# Patient Record
Sex: FEMALE | Race: WHITE | Employment: PART TIME | URBAN - METROPOLITAN AREA
[De-identification: names, ages, dates, MRNs, and addresses within clinical notes are randomized per-mention and may not be internally consistent; named-entity substitution may affect disease eponyms.]

---

## 2022-02-28 NOTE — PERIOP NOTES
PAT APPT 3/7/22 @ 800 /  CLASS 3/7/22 @ 350 / PATIENT WILL HAVE COVID TEST DONE AT [facility] AND WILL FAX THE RESULTS  [facility]

## 2022-03-07 ENCOUNTER — HOSPITAL ENCOUNTER (OUTPATIENT)
Dept: PREADMISSION TESTING | Age: 67
Discharge: HOME | End: 2022-03-07
Payer: MEDICARE

## 2022-03-07 VITALS
HEART RATE: 64 BPM | SYSTOLIC BLOOD PRESSURE: 120 MMHG | BODY MASS INDEX: 39.9 KG/M2 | DIASTOLIC BLOOD PRESSURE: 75 MMHG | WEIGHT: 233.69 LBS | TEMPERATURE: 98.4 F | HEIGHT: 64 IN

## 2022-03-07 LAB
ABO + RH BLD: NORMAL
APPEARANCE UR: CLEAR
ATRIAL RATE: 62 BPM
BACTERIA URNS QL MICRO: NEGATIVE /HPF
BILIRUB UR QL: NEGATIVE
BLOOD GROUP ANTIBODIES SERPL: NORMAL
CALCULATED P AXIS, ECG09: 44 DEGREES
CALCULATED R AXIS, ECG10: -12 DEGREES
CALCULATED T AXIS, ECG11: 7 DEGREES
COLOR UR: ABNORMAL
DIAGNOSIS, 93000: NORMAL
EPITH CASTS URNS QL MICRO: ABNORMAL /LPF
GLUCOSE UR STRIP.AUTO-MCNC: NEGATIVE MG/DL
HGB UR QL STRIP: NEGATIVE
HYALINE CASTS URNS QL MICRO: ABNORMAL /LPF (ref 0–5)
KETONES UR QL STRIP.AUTO: ABNORMAL MG/DL
LEUKOCYTE ESTERASE UR QL STRIP.AUTO: NEGATIVE
MUCOUS THREADS URNS QL MICRO: ABNORMAL /LPF
NITRITE UR QL STRIP.AUTO: NEGATIVE
P-R INTERVAL, ECG05: 170 MS
PH UR STRIP: 5.5 [PH] (ref 5–8)
PROT UR STRIP-MCNC: 30 MG/DL
Q-T INTERVAL, ECG07: 446 MS
QRS DURATION, ECG06: 96 MS
QTC CALCULATION (BEZET), ECG08: 452 MS
RBC #/AREA URNS HPF: ABNORMAL /HPF (ref 0–5)
SP GR UR REFRACTOMETRY: >1.03
SPECIMEN EXP DATE BLD: NORMAL
UA: UC IF INDICATED,UAUC: ABNORMAL
UROBILINOGEN UR QL STRIP.AUTO: 1 EU/DL (ref 0.2–1)
VENTRICULAR RATE, ECG03: 62 BPM
WBC URNS QL MICRO: ABNORMAL /HPF (ref 0–4)

## 2022-03-07 PROCEDURE — 81001 URINALYSIS AUTO W/SCOPE: CPT

## 2022-03-07 PROCEDURE — 93005 ELECTROCARDIOGRAM TRACING: CPT

## 2022-03-07 PROCEDURE — 86900 BLOOD TYPING SEROLOGIC ABO: CPT

## 2022-03-07 PROCEDURE — 36415 COLL VENOUS BLD VENIPUNCTURE: CPT

## 2022-03-07 RX ORDER — LISINOPRIL 10 MG/1
10 TABLET ORAL
COMMUNITY

## 2022-03-07 RX ORDER — ATORVASTATIN CALCIUM 80 MG/1
80 TABLET, FILM COATED ORAL
COMMUNITY

## 2022-03-07 RX ORDER — ASPIRIN 81 MG/1
162 TABLET ORAL
COMMUNITY
End: 2022-03-21

## 2022-03-07 RX ORDER — AMOXICILLIN 875 MG/1
875 TABLET, FILM COATED ORAL 2 TIMES DAILY
COMMUNITY

## 2022-03-07 RX ORDER — METOPROLOL TARTRATE 25 MG/1
12.5 TABLET, FILM COATED ORAL 2 TIMES DAILY
COMMUNITY

## 2022-03-07 NOTE — PERIOP NOTES
COVID-19 VACCINE COPY ATTACHED TO CHART. SPOKE WITH JOMAR AT DR. Alysia Maldonado, CARDIOLOGIST OFFICE. REQUESTED MOST RECENT OFFICE NOTES, EKG, & TEST(S) TO BE FAXED TO PAT. PATIENT SCHEDULED TO HAVE STRESS TEST ON 3/14/22. PAT WILL NEED TO CALL FOR RESULTS AFTER TEST.

## 2022-03-07 NOTE — PERIOP NOTES
[facility] INSTRUCTIONS  ORTHOPAEDIC    Surgery Date:   03/17/2022    [facility] staff will call you between 4 PM- 8 PM the day before surgery with your arrival time. If your surgery is on a Monday, we will call you the preceding Friday. Please call [phone] after 8 PM if you did not receive your arrival time. 1. Please report to [facility] Patient Access/Admitting on the 1st floor. Bring your insurance card, photo identification, and any copayment (if applicable). 2. If you are going home the same day of your surgery, you must have a responsible adult to drive you home. You need to have a responsible adult to stay with you the first 24 hours after surgery and you should not drive a car for 24 hours following your surgery. 3. Do NOT eat any solid foods after midnight the night before surgery including candy, mints or gum. You may drink clear liquids from midnight until 1 hour prior to arrival time. You may drink up to 12 ounces at one time every 4 hours. 4. Do NOT drink alcohol or smoke 24 hours before surgery. STOP smoking for 14 days prior as it helps with breathing and healing after surgery. 5. If your arrival time is 3pm or later, you may eat a light breakfast before 8am (toast, bagel-no butter, black coffee, plain tea, fruit juice-no pulp) Please note special instructions, if applicable, below for medications. 6. If you are being admitted to the hospital,please leave personal belongings/luggage in your car until you have an assigned hospital room number. 7. Please wear comfortable clothes. Wear your glasses instead of contacts. We ask that all money, jewelry and valuables be left at home. Wear no make up, particularly mascara, the day of surgery. 8.  All body piercings, rings, and jewelry need to be removed and left at home. Please remove any nail polish or artificial nails from your fingernails. Please wear your hair loose or down.  Please no pony-tails, buns, or any metal hair accessories. If you shower the morning of surgery, please do not apply any lotions or powders afterwards. You may wear deodorant. Do not shave any body area within 24 hours of your surgery. 9. Please follow all instructions to avoid any potential surgical cancellation. 10. Should your physical condition change, (i.e. fever, cold, flu, etc.) please notify your surgeon as soon as possible. 11. It is important to be on time. If a situation occurs where you may be delayed, please call:  [phone] / [phone] on the day of surgery. 12. The Preadmission Testing staff can be reached at [phone]. 13. Special instructions: N/A    Current Outpatient Medications   Medication Sig    atorvastatin (LIPITOR) 80 mg tablet Take 80 mg by mouth nightly.  amoxicillin (AMOXIL) 875 mg tablet Take 875 mg by mouth two (2) times a day.  metoprolol tartrate (LOPRESSOR) 25 mg tablet Take 12.5 mg by mouth two (2) times a day.  cholecalciferol, vitamin D3, (VITAMIN D3 PO) Take 1 Capsule by mouth two (2) times a day. 250 MCG    acetaminophen/diphenhydramine (TYLENOL PM PO) Take 2 Capsules by mouth nightly.  lisinopriL (PRINIVIL, ZESTRIL) 10 mg tablet Take 10 mg by mouth every morning.  MELATONIN PO Take 1 Tablet by mouth nightly.  L. rhamnosus GG/inulin (CULTURELLE DIGESTIVE HEALTH PO) Take 1 Tablet by mouth nightly.  aspirin delayed-release 81 mg tablet Take 162 mg by mouth nightly. No current facility-administered medications for this encounter. 1. YOU MUST ONLY TAKE THESE MEDICATIONS THE MORNING OF SURGERY WITH A SIP OF WATER: METOPROLOL  2. MEDICATIONS TO TAKE THE MORNING OF SURGERY ONLY IF NEEDED: N/A  3. HOLD these prescription medications BEFORE Surgery: LISINOPRIL  4. Ask your surgeon/prescribing physician about when/if to STOP taking these medications: ASPIRIN  5. Stop any non-steroidal anti-inflammatory drugs (i.e. Ibuprofen, Naproxen, Advil, Aleve) 3 days before surgery.  You may take Tylenol. STOP all vitamins and herbal supplements 1 week prior to  surgery. 6. If you are currently taking Plavix, Coumadin, or any other blood-thinning/anticoagulant medication contact your prescribing physician for instructions. Preventing Infections Before and After - Your Surgery    IMPORTANT INSTRUCTIONS    You play an important role in your health and preparation for surgery. To reduce the germs on your skin you will need to shower with CHG soap (Chorhexidine gluconate 4%) two times before surgery. CHG soap (Hibiclens, Hex-A-Clens or store brand)   CHG soap will be provided at your Preadmission Testing (PAT) appointment.  If you do not have a PAT appointment before surgery, you may arrange to  CHG soap from our office or purchase CHG soap at a pharmacy, grocery or department store.  You need to purchase TWO 4 ounce bottles to use for your 2 showers. Steps to follow:  1. Wash your hair with your normal shampoo and your body with regular soap and rinse well to remove shampoo and soap from your skin. 2. Wet a clean washcloth and turn off the shower. 3. Put CHG soap on washcloth and apply to your entire body from the neck down. Do not use on your head, face or private parts(genitals). Do not use CHG soap on open sores, wounds or areas of skin irritation. 4. Wash you body gently for 5 minutes. Do not wash your skin too hard. This soap does not create lather. Pay special attention to your underarms and from your belly button to your feet. 5. Turn the shower back on and rinse well to get CHG soap off your body. 6. Pat your skin dry with a clean, dry towel. Do not apply lotions or moisturizer. 7. Put on clean clothes and sleep on fresh bed sheets and do not allow pets to sleep with you. Shower with CHG soap 2 times before your surgery   The evening before your surgery   The morning of your surgery      Tips to help prevent infections after your surgery:  1.  Protect your surgical wound from germs:  ? Hand washing is the most important thing you and your caregivers can do to prevent infections. ? Keep your bandage clean and dry! ? Do not touch your surgical wound. 2. Use clean, freshly washed towels and washcloths every time you shower; do not share bath linens with others. 3. Until your surgical wound is healed, wear clothing and sleep on bed linens each day that are clean and freshly washed. 4. Do not allow pets to sleep in your bed with you or touch your surgical wound. 5. Do not smoke - smoking delays wound healing. This may be a good time to stop smoking. 6. If you have diabetes, it is important for you to manage your blood sugar levels properly before your surgery as well as after your surgery. Poorly managed blood sugar levels slow down wound healing and prevent you from healing completely. Prevention of Infection  Testing for Staphylococcus aureus on your skin before surgery    Staphylococcus aureus (staph) is a common bacteria that is found on the body. It normally does not cause infection on healthy skin. Before surgery, you will be tested to see if you have staph by swabbing the inside of your nose. When you have an incision with surgery, the goal is to protect that incision from infection. Removal of the staph bacteria before surgery can decrease the risk of a surgical site infection. If your nose swab is positive for staph you will be called. Your treatment will include 2 steps:   Prescription for Mupirocin ointment to be used in each nostril twice a day for 5 days.  Showering with Chlorhexidine (CHG) liquid soap for 5 days prior to surgery. How to use Mupirocin ointment in your nose  1.  the prescription from your pharmacy. You will receive a large tube of ointment which will be big enough for all of your treatments. You will apply this ointment to each nostril 2 times a day for 5 days.   2. Wash your hands with  gel or soap and water for 20 seconds before using ointment. 3. Place a pea-sized amount of ointment on a cotton Q-tip. 4. Apply ointment just inside of each nostril with the Q-tip. Do not push Q-tip or ointment deep inside you nose. 5. Press your nostrils together and massage for a few seconds. 6. Wash your hands with  gel or soap and water after you are finished. 7. Do not get ointment near your eyes. If it gets into your eyes, rinse them with cool water. 8. If you need to use nasal spray, clean the tip of the bottle with alcohol before use and do not use both at the same time. 9. If you are scheduled for COVID testing during the 5 days, do NOT apply morning dose until after the COVID test has been performed. How to use Chlorhexidine (CHG) 4% liquid soap  1. Purchase an 8 ounce bottle of CHG liquid soap (Chlorhexidine 4%, Hibiclens, Hex-A-Clens or store brand) at a pharmacy or grocery store. 2. Wash your hair with your normal shampoo and your body with regular soap and rinse well to remove shampoo and soap from your skin. 3. Wet a clean washcloth and turn off the shower. 4. Put CHG soap on washcloth and apply to your entire body from the neck down. Do not use on your head, face or private parts(genitals). Do not use CHG soap on open sores, wounds or areas of skin irritation. 5. Wash your body gently for 5 minutes. Do not wash your skin too hard. This soap does not create lather. Pay special attention to your underarms and from your belly button to your feet. 6. Turn the shower back on and rinse well to get CHG soap off your body. 7. Pat your skin dry with a clean, dry towel. Do not apply lotions or moisturizer. 8. Put on clean clothes and sleep on fresh bed sheets the night before surgery. Do not allow pets to sleep with you. Eating and Drinking Before Surgery     You may eat a regular dinner at the usual time on the day before your surgery.    Do NOT eat any solid foods after midnight unless your arrival time at the hospital is 3pm or later.  You may drink clear liquids only from 12 midnight until 1 hours prior to your arrival time at the hospital on the day of your surgery. Do NOT drink alcohol.  Clear liquids include:  o Water  o Fruit juices without pulp( i.e. apple juice)  o Carbonated beverages  o Black coffee (no cream/milk)  o Tea (no cream/milk)  o Gatorade   You may drink up to 12-16 ounces at one time every 4 hours between the hours of midnight and 1 hour before your arrival time at the hospital. Example- if your arrival time at the hospital is 6am, you may drink 12-16 ounces of clear liquids no later than 5am.   If your arrival time at the hospital is 3pm or later, you may eat a light breakfast before 8am.   A light breakfast includes:  o Toast or bagel (no butter)  o Black coffee (no cream/milk)  o Tea (no cream/milk)  o Fruit juices without pulp ( i.e. apple juice)  o Do NOT eat meat, eggs, vegetables or fruit   If you have any questions, please contact your surgeon's office. [facility]   Instructions for Pre-Surgery COVID-19 Testing     Across our ministry we have established standard guidelines to ensure the health and safety of our patients, residents and associates as we resume elective services for patients. All patients presenting for surgery are required to have a COVID-19 test result within 96 hours of their scheduled surgery. [employer] is providing this test free of charge to the patient.    Instructions for COVID-19 Testing:     Patients will receive a call from Pre-Admission Testing 4-5 days prior to surgery to schedule a date and time to come to the [address] [place] [address] Drive for their COVID-19 test   Patients are advised to self-quarantine after testing until their scheduled surgery   Once on site, patients will be registered and receive COVID test in their vehicle   If a patient is scheduled for normal Pre Admission Testing 96 hours from date of surgery, the patient will still have their COVID test done at the [address] [place] [address] located at [address] [place] [address] Positive results will be shared with the surgeon and anesthesiologist and may result in cancellation of the elective procedure    Testing Hours and Location:   Address:  [address] Admission [facility] in the Discharge Lot on [address] (Map Attached)  [facility], [address]   Hours: Monday- Friday 7a-3p    PAT Phone Number: [phone]            Patient Information Regarding COVID Restrictions    Patients are advised to self-quarantine after COVID testing up to the day of the scheduled procedure. Day of Procedure     Please park in the parking deck or any designated visitor parking lot.  Enter the facility through the Main Entrance of the hospital.   A temperature check and appropriate symptom/exposure screening will be done prior to entry to the facility.  On the day of surgery, please provide the cell phone number of the person who will be waiting for you to the Patient Access representative at the time of registration.  Please wear a mask on the day of your procedure.  We are now allowing one designated visitor per stay. Pediatric patients may have 2 designated visitors. This one person may come in with you on the day of your procedure.  No visitors under the age of 13.  The designated visitor must also wear a mask.  Once your procedure and the immediate recovery period is completed, a nurse in the recovery area will contact your designated visitor to inform them of your room number or to otherwise review other pertinent information regarding your care.  Social distancing practices are to be adhered to in waiting areas and the cafeteria. The patient was contacted in person. She verbalized understanding of all instructions does not  need reinforcement.

## 2022-03-08 LAB
BACTERIA SPEC CULT: NORMAL
BACTERIA SPEC CULT: NORMAL
SERVICE CMNT-IMP: NORMAL

## 2022-03-08 NOTE — PERIOP NOTES
RECEIVED CARDIAC NOTES AND PLACED ON CHART    FAXED OUTSIDE LAB RESULTS AND EKG TO DR. GRUBBS OFFICE WITH CONFIRMATION OF FAX RECIEVED

## 2022-03-08 NOTE — PERIOP NOTES
PAT Nurse Practitioner   Pre-Operative Chart Review/Assessment:-ORTHOPEDIC                Patient Name:  Kiet Sexton                                                           Age:   73 y.o.    :  1955     Today's Date:  3/9/2022     Date of PAT:   3/7/2022      Date of Surgery:    3/17/2022      Procedure(s):  Left Total Hip Arthroplasty     Surgeon:   Dr. Geoffrey Chavez                       PLAN:      1)  Medical Clearance:  Dr. Armando Conklin      2)  Cardiac Clearance:  Pt followed by Dr. Giovanna Hamman H&V). LOV 3/2/22. ECHO 3/14/22. Clearance obtained on 3/16/22. OV note, ECHO and clearance letter on chart. PAT EKG: normal EKG, normal sinus rhythm. 3)  Diabetic Treatment Consult:  Not indicated. A1c-6.1      4)  Sleep Apnea evaluation:   TYLER score of 4. Pt reports daytime fatigue and a diagnosis of HTN. Pt denies loud snoring that can be heard through a closed door and witnessed pauses in breathing. Pt previously referred to sleep medicine. SS pending.        5) Treatment for MRSA/Staph Aureus:  Neg      6) Additional Concerns:  Former smoker, HTN, PreDM, hx of CVA, CAD/MI s/p stent x 3, obesity              Vital Signs:         Vitals:    22 0836   BP: 120/75   Pulse: 64   Temp: 98.4 °F (36.9 °C)   Weight: 106 kg (233 lb 11 oz)   Height: 5' 4\" (1.626 m)            ____________________________________________  PAST MEDICAL HISTORY  Past Medical History:   Diagnosis Date    Anemia     HISTORY OF ANEMIA    Arthritis     BACK & HIPS    Cancer ([address]     RIGHT UPPER CHEST    Heart attack ([address]     Hypertension     Prediabetes     Rotator cuff injury     LEFT SIDE    Stroke ([address] 2006      ____________________________________________  PAST SURGICAL HISTORY  Past Surgical History:   Procedure Laterality Date    HX COLONOSCOPY      HX CORONARY STENT PLACEMENT  2016    X3 STENTS    HX ENDOSCOPY      HX ORTHOPAEDIC Right 2015    HX ORTHOPAEDIC Left     FOOT    HX OTHER SURGICAL  HX WISDOM TEETH EXTRACTION      [place] CARDIAC SURG PROCEDURE UNLIST      TITANIUM MUSHROOM PATCH IN HEART MCV/VCU (DUE TO A HOLE IN HEART)      ____________________________________________  HOME MEDICATIONS  Current Outpatient Medications   Medication Sig    atorvastatin (LIPITOR) 80 mg tablet Take 80 mg by mouth nightly.  amoxicillin (AMOXIL) 875 mg tablet Take 875 mg by mouth two (2) times a day.  metoprolol tartrate (LOPRESSOR) 25 mg tablet Take 12.5 mg by mouth two (2) times a day.  cholecalciferol, vitamin D3, (VITAMIN D3 PO) Take 1 Capsule by mouth two (2) times a day. 250 MCG    acetaminophen/diphenhydramine (TYLENOL PM PO) Take 2 Capsules by mouth nightly.  lisinopriL (PRINIVIL, ZESTRIL) 10 mg tablet Take 10 mg by mouth every morning.  MELATONIN PO Take 1 Tablet by mouth nightly.  L. rhamnosus GG/inulin (CULTURELLE DIGESTIVE HEALTH PO) Take 1 Tablet by mouth nightly.  aspirin delayed-release 81 mg tablet Take 162 mg by mouth nightly. No current facility-administered medications for this encounter.      ____________________________________________  ALLERGIES  No Known Allergies   ____________________________________________  SOCIAL HISTORY  Social History     Tobacco Use    Smoking status: Former Smoker     Packs/day: 0.50     Years: 10.00     Pack years: 5.00     Quit date: 2000     Years since quittin.2    Smokeless tobacco: Not on file   Substance Use Topics    Alcohol use:  Yes     Alcohol/week: 2.0 standard drinks     Types: 1 Cans of beer, 1 Shots of liquor per week     Comment: ON OCCASION      ____________________________________________   Internal Administration   First Dose COVID-19, Westly Poisson, Primary or Immunocompromised Series, MRNA, PF, 100mcg/0.5mL  2021   Second Dose COVID-19, Moderna, Primary or Immunocompromised Series, MRNA, PF, 100mcg/0.5mL  2022      Last COVID Lab Pt to have COVID test done at [facility] and fax results to PAT prior to surgery. Labs:     Hospital Outpatient Visit on 03/07/2022   Component Date Value Ref Range Status    Crossmatch Expiration 03/07/2022 03/20/2022,2359   Final    ABO/Rh(D) 03/07/2022 O POSITIVE   Final    Antibody screen 03/07/2022 NEG   Final    Color 03/07/2022 DARK YELLOW    Final    Color Reference Range: Straw, Yellow or Dark Yellow    Appearance 03/07/2022 CLEAR  CLEAR   Final    Specific gravity 03/07/2022 >1.030   Final    pH (UA) 03/07/2022 5.5  5.0 - 8.0   Final    Protein 03/07/2022 30* NEG mg/dL Final    Glucose 03/07/2022 Negative  NEG mg/dL Final    Ketone 03/07/2022 TRACE* NEG mg/dL Final    Bilirubin 03/07/2022 Negative  NEG   Final    Blood 03/07/2022 Negative  NEG   Final    Urobilinogen 03/07/2022 1.0  0.2 - 1.0 EU/dL Final    Nitrites 03/07/2022 Negative  NEG   Final    Leukocyte Esterase 03/07/2022 Negative  NEG   Final    WBC 03/07/2022 0-4  0 - 4 /hpf Final    RBC 03/07/2022 0-5  0 - 5 /hpf Final    Epithelial cells 03/07/2022 FEW  FEW /lpf Final    Epithelial cell category consists of squamous cells and /or transitional urothelial cells. Renal tubular cells, if present, are separately identified as such.     Bacteria 03/07/2022 Negative  NEG /hpf Final    UA:UC IF INDICATED 03/07/2022 CULTURE NOT INDICATED BY UA RESULT  CNI   Final    Mucus 03/07/2022 2+* NEG /lpf Final    Hyaline cast 03/07/2022 0-2  0 - 5 /lpf Final    Special Requests: 03/07/2022 NO SPECIAL REQUESTS    Final    Culture result: 03/07/2022 MRSA NOT PRESENT    Final            Ventricular Rate 03/07/2022 62  BPM Final    Atrial Rate 03/07/2022 62  BPM Final    P-R Interval 03/07/2022 170  ms Final    QRS Duration 03/07/2022 96  ms Final    Q-T Interval 03/07/2022 446  ms Final    QTC Calculation (Bezet) 03/07/2022 452  ms Final    Calculated P Axis 03/07/2022 44  degrees Final    Calculated R Axis 03/07/2022 -12  degrees Final    Calculated T Axis 03/07/2022 7  degrees Final    Diagnosis 03/07/2022    Final                    Value:Normal sinus rhythm    No previous ECGs available  Confirmed by Aldo Gustafson M.D., Jackson Harrington ([id number]) on 3/7/2022 10:13:10 PM         Skin:     Denies open wounds, cuts, sores, rashes or other areas of concern in PAT assessment.           Ruy Heredia, NP

## 2022-03-16 NOTE — PERIOP NOTES
SPOKE WITH PATIENT, CONFIRMED SHE HAD HER COVID TEST DONE AND SHE HAS. SHE WILL BRING A COPY OF THE RESULTS WITH DAY OF SURGERY.

## 2022-03-17 ENCOUNTER — APPOINTMENT (OUTPATIENT)
Dept: GENERAL RADIOLOGY | Age: 67
End: 2022-03-17
Attending: ORTHOPAEDIC SURGERY
Payer: MEDICARE

## 2022-03-17 ENCOUNTER — ANESTHESIA (OUTPATIENT)
Dept: SURGERY | Age: 67
End: 2022-03-17
Payer: MEDICARE

## 2022-03-17 ENCOUNTER — ANESTHESIA EVENT (OUTPATIENT)
Dept: SURGERY | Age: 67
End: 2022-03-17
Payer: MEDICARE

## 2022-03-17 ENCOUNTER — HOSPITAL ENCOUNTER (OUTPATIENT)
Age: 67
Setting detail: OBSERVATION
Discharge: HOME | End: 2022-03-21
Attending: ORTHOPAEDIC SURGERY | Admitting: ORTHOPAEDIC SURGERY
Payer: MEDICARE

## 2022-03-17 DIAGNOSIS — Z96.642 S/P TOTAL LEFT HIP ARTHROPLASTY: Primary | ICD-10-CM

## 2022-03-17 DIAGNOSIS — M16.12 PRIMARY OSTEOARTHRITIS OF LEFT HIP: ICD-10-CM

## 2022-03-17 LAB
GLUCOSE BLD STRIP.AUTO-MCNC: 93 MG/DL (ref 65–117)
SERVICE CMNT-IMP: NORMAL

## 2022-03-17 PROCEDURE — 76060000037 HC ANESTHESIA 3 TO 3.5 HR: Performed by: ORTHOPAEDIC SURGERY

## 2022-03-17 PROCEDURE — 74011000250 HC RX REV CODE- 250: Performed by: ORTHOPAEDIC SURGERY

## 2022-03-17 PROCEDURE — 74011000250 HC RX REV CODE- 250: Performed by: NURSE ANESTHETIST, CERTIFIED REGISTERED

## 2022-03-17 PROCEDURE — 77030006835 HC BLD SAW SAG STRY -B: Performed by: ORTHOPAEDIC SURGERY

## 2022-03-17 PROCEDURE — 2709999900 HC NON-CHARGEABLE SUPPLY: Performed by: ORTHOPAEDIC SURGERY

## 2022-03-17 PROCEDURE — 74011000258 HC RX REV CODE- 258: Performed by: ORTHOPAEDIC SURGERY

## 2022-03-17 PROCEDURE — G0378 HOSPITAL OBSERVATION PER HR: HCPCS

## 2022-03-17 PROCEDURE — 73501 X-RAY EXAM HIP UNI 1 VIEW: CPT

## 2022-03-17 PROCEDURE — 77030005513 HC CATH URETH FOL11 MDII -B: Performed by: ORTHOPAEDIC SURGERY

## 2022-03-17 PROCEDURE — 77030040361 HC SLV COMPR DVT MDII -B: Performed by: ORTHOPAEDIC SURGERY

## 2022-03-17 PROCEDURE — 74011250636 HC RX REV CODE- 250/636: Performed by: ANESTHESIOLOGY

## 2022-03-17 PROCEDURE — 72170 X-RAY EXAM OF PELVIS: CPT

## 2022-03-17 PROCEDURE — 76010000173 HC OR TIME 3 TO 3.5 HR INTENSV-TIER 1: Performed by: ORTHOPAEDIC SURGERY

## 2022-03-17 PROCEDURE — 77030040361 HC SLV COMPR DVT MDII -B

## 2022-03-17 PROCEDURE — 77030010507 HC ADH SKN DERMBND J&J -B: Performed by: ORTHOPAEDIC SURGERY

## 2022-03-17 PROCEDURE — 77030020788: Performed by: ORTHOPAEDIC SURGERY

## 2022-03-17 PROCEDURE — 76210000017 HC OR PH I REC 1.5 TO 2 HR: Performed by: ORTHOPAEDIC SURGERY

## 2022-03-17 PROCEDURE — 77030011264 HC ELECTRD BLD EXT COVD -A: Performed by: ORTHOPAEDIC SURGERY

## 2022-03-17 PROCEDURE — 74011250637 HC RX REV CODE- 250/637: Performed by: ANESTHESIOLOGY

## 2022-03-17 PROCEDURE — 74011250636 HC RX REV CODE- 250/636: Performed by: ORTHOPAEDIC SURGERY

## 2022-03-17 PROCEDURE — 74011250636 HC RX REV CODE- 250/636: Performed by: NURSE ANESTHETIST, CERTIFIED REGISTERED

## 2022-03-17 PROCEDURE — 77030026438 HC STYL ET INTUB CARD -A: Performed by: ANESTHESIOLOGY

## 2022-03-17 PROCEDURE — C1776 JOINT DEVICE (IMPLANTABLE): HCPCS | Performed by: ORTHOPAEDIC SURGERY

## 2022-03-17 PROCEDURE — 77030003666 HC NDL SPINAL BD -A: Performed by: ORTHOPAEDIC SURGERY

## 2022-03-17 PROCEDURE — 77030035236 HC SUT PDS STRATFX BARB J&J -B: Performed by: ORTHOPAEDIC SURGERY

## 2022-03-17 PROCEDURE — 82962 GLUCOSE BLOOD TEST: CPT

## 2022-03-17 PROCEDURE — 77030031139 HC SUT VCRL2 J&J -A: Performed by: ORTHOPAEDIC SURGERY

## 2022-03-17 PROCEDURE — C9290 INJ, BUPIVACAINE LIPOSOME: HCPCS | Performed by: ORTHOPAEDIC SURGERY

## 2022-03-17 PROCEDURE — 74011000250 HC RX REV CODE- 250: Performed by: ANESTHESIOLOGY

## 2022-03-17 PROCEDURE — 77030002933 HC SUT MCRYL J&J -A: Performed by: ORTHOPAEDIC SURGERY

## 2022-03-17 PROCEDURE — 77030008684 HC TU ET CUF COVD -B: Performed by: ANESTHESIOLOGY

## 2022-03-17 PROCEDURE — 74011250637 HC RX REV CODE- 250/637: Performed by: ORTHOPAEDIC SURGERY

## 2022-03-17 DEVICE — BIOLOX DELTA CERAMIC FEMORAL HEAD +8.5 36MM DIA 12/14 TAPER
Type: IMPLANTABLE DEVICE | Site: HIP | Status: FUNCTIONAL
Brand: BIOLOX DELTA

## 2022-03-17 DEVICE — PINNACLE HIP SOLUTIONS ALTRX POLYETHYLENE ACETABULAR LINER NEUTRAL 36MM ID 52MM OD
Type: IMPLANTABLE DEVICE | Site: HIP | Status: FUNCTIONAL
Brand: PINNACLE ALTRX

## 2022-03-17 DEVICE — PINNACLE GRIPTION ACETABULAR SHELL SECTOR 52MM OD
Type: IMPLANTABLE DEVICE | Site: HIP | Status: FUNCTIONAL
Brand: PINNACLE GRIPTION

## 2022-03-17 DEVICE — HIP H2 TOT ADV OTHER HD IMPL CAPPED SYNTHES: Type: IMPLANTABLE DEVICE | Status: FUNCTIONAL

## 2022-03-17 DEVICE — ACTIS DUOFIX HIP PROSTHESIS (FEMORAL STEM 12/14 TAPER CEMENTLESS SIZE 7 STD COLLAR)  CE
Type: IMPLANTABLE DEVICE | Site: HIP | Status: FUNCTIONAL
Brand: ACTIS

## 2022-03-17 RX ORDER — GLYCOPYRROLATE 0.2 MG/ML
INJECTION INTRAMUSCULAR; INTRAVENOUS AS NEEDED
Status: DISCONTINUED | OUTPATIENT
Start: 2022-03-17 | End: 2022-03-17 | Stop reason: HOSPADM

## 2022-03-17 RX ORDER — LANOLIN ALCOHOL/MO/W.PET/CERES
1.5 CREAM (GRAM) TOPICAL
Status: DISCONTINUED | OUTPATIENT
Start: 2022-03-17 | End: 2022-03-21 | Stop reason: HOSPADM

## 2022-03-17 RX ORDER — SODIUM CHLORIDE 9 MG/ML
25 INJECTION, SOLUTION INTRAVENOUS CONTINUOUS
Status: DISCONTINUED | OUTPATIENT
Start: 2022-03-17 | End: 2022-03-17 | Stop reason: HOSPADM

## 2022-03-17 RX ORDER — LABETALOL HYDROCHLORIDE 5 MG/ML
INJECTION, SOLUTION INTRAVENOUS AS NEEDED
Status: DISCONTINUED | OUTPATIENT
Start: 2022-03-17 | End: 2022-03-17 | Stop reason: HOSPADM

## 2022-03-17 RX ORDER — OXYCODONE HYDROCHLORIDE 5 MG/1
5 TABLET ORAL AS NEEDED
Status: DISCONTINUED | OUTPATIENT
Start: 2022-03-17 | End: 2022-03-17 | Stop reason: HOSPADM

## 2022-03-17 RX ORDER — MIDAZOLAM HYDROCHLORIDE 1 MG/ML
0.5 INJECTION, SOLUTION INTRAMUSCULAR; INTRAVENOUS
Status: DISCONTINUED | OUTPATIENT
Start: 2022-03-17 | End: 2022-03-17 | Stop reason: HOSPADM

## 2022-03-17 RX ORDER — HYDROMORPHONE HYDROCHLORIDE 2 MG/ML
INJECTION, SOLUTION INTRAMUSCULAR; INTRAVENOUS; SUBCUTANEOUS AS NEEDED
Status: DISCONTINUED | OUTPATIENT
Start: 2022-03-17 | End: 2022-03-17 | Stop reason: HOSPADM

## 2022-03-17 RX ORDER — TRANEXAMIC ACID 100 MG/ML
INJECTION, SOLUTION INTRAVENOUS AS NEEDED
Status: DISCONTINUED | OUTPATIENT
Start: 2022-03-17 | End: 2022-03-17 | Stop reason: HOSPADM

## 2022-03-17 RX ORDER — SODIUM CHLORIDE 0.9 % (FLUSH) 0.9 %
5-40 SYRINGE (ML) INJECTION EVERY 8 HOURS
Status: DISCONTINUED | OUTPATIENT
Start: 2022-03-17 | End: 2022-03-21 | Stop reason: HOSPADM

## 2022-03-17 RX ORDER — DIPHENHYDRAMINE HYDROCHLORIDE 50 MG/ML
12.5 INJECTION, SOLUTION INTRAMUSCULAR; INTRAVENOUS AS NEEDED
Status: DISCONTINUED | OUTPATIENT
Start: 2022-03-17 | End: 2022-03-17 | Stop reason: HOSPADM

## 2022-03-17 RX ORDER — ACETAMINOPHEN 325 MG/1
650 TABLET ORAL ONCE
Status: COMPLETED | OUTPATIENT
Start: 2022-03-17 | End: 2022-03-17

## 2022-03-17 RX ORDER — SODIUM CHLORIDE 0.9 % (FLUSH) 0.9 %
5-40 SYRINGE (ML) INJECTION AS NEEDED
Status: DISCONTINUED | OUTPATIENT
Start: 2022-03-17 | End: 2022-03-21 | Stop reason: HOSPADM

## 2022-03-17 RX ORDER — ROCURONIUM BROMIDE 10 MG/ML
INJECTION, SOLUTION INTRAVENOUS AS NEEDED
Status: DISCONTINUED | OUTPATIENT
Start: 2022-03-17 | End: 2022-03-17 | Stop reason: HOSPADM

## 2022-03-17 RX ORDER — PROPOFOL 10 MG/ML
INJECTION, EMULSION INTRAVENOUS AS NEEDED
Status: DISCONTINUED | OUTPATIENT
Start: 2022-03-17 | End: 2022-03-17 | Stop reason: HOSPADM

## 2022-03-17 RX ORDER — LIDOCAINE HYDROCHLORIDE 20 MG/ML
INJECTION, SOLUTION EPIDURAL; INFILTRATION; INTRACAUDAL; PERINEURAL AS NEEDED
Status: DISCONTINUED | OUTPATIENT
Start: 2022-03-17 | End: 2022-03-17 | Stop reason: HOSPADM

## 2022-03-17 RX ORDER — VECURONIUM BROMIDE FOR INJECTION 1 MG/ML
INJECTION, POWDER, LYOPHILIZED, FOR SOLUTION INTRAVENOUS AS NEEDED
Status: DISCONTINUED | OUTPATIENT
Start: 2022-03-17 | End: 2022-03-17 | Stop reason: HOSPADM

## 2022-03-17 RX ORDER — ROPIVACAINE HYDROCHLORIDE 5 MG/ML
150 INJECTION, SOLUTION EPIDURAL; INFILTRATION; PERINEURAL AS NEEDED
Status: DISCONTINUED | OUTPATIENT
Start: 2022-03-17 | End: 2022-03-17 | Stop reason: HOSPADM

## 2022-03-17 RX ORDER — MIDAZOLAM HYDROCHLORIDE 1 MG/ML
1 INJECTION, SOLUTION INTRAMUSCULAR; INTRAVENOUS AS NEEDED
Status: DISCONTINUED | OUTPATIENT
Start: 2022-03-17 | End: 2022-03-17 | Stop reason: HOSPADM

## 2022-03-17 RX ORDER — DEXMEDETOMIDINE HYDROCHLORIDE 100 UG/ML
INJECTION, SOLUTION INTRAVENOUS AS NEEDED
Status: DISCONTINUED | OUTPATIENT
Start: 2022-03-17 | End: 2022-03-17 | Stop reason: HOSPADM

## 2022-03-17 RX ORDER — METOPROLOL TARTRATE 25 MG/1
12.5 TABLET, FILM COATED ORAL 2 TIMES DAILY
Status: DISCONTINUED | OUTPATIENT
Start: 2022-03-17 | End: 2022-03-21 | Stop reason: HOSPADM

## 2022-03-17 RX ORDER — FENTANYL CITRATE 50 UG/ML
25 INJECTION, SOLUTION INTRAMUSCULAR; INTRAVENOUS
Status: DISCONTINUED | OUTPATIENT
Start: 2022-03-17 | End: 2022-03-17 | Stop reason: HOSPADM

## 2022-03-17 RX ORDER — FACIAL-BODY WIPES
10 EACH TOPICAL DAILY PRN
Status: DISCONTINUED | OUTPATIENT
Start: 2022-03-19 | End: 2022-03-21 | Stop reason: HOSPADM

## 2022-03-17 RX ORDER — MORPHINE SULFATE 2 MG/ML
2 INJECTION, SOLUTION INTRAMUSCULAR; INTRAVENOUS
Status: DISCONTINUED | OUTPATIENT
Start: 2022-03-17 | End: 2022-03-17 | Stop reason: HOSPADM

## 2022-03-17 RX ORDER — OXYCODONE HYDROCHLORIDE 5 MG/1
5 TABLET ORAL
Status: DISCONTINUED | OUTPATIENT
Start: 2022-03-17 | End: 2022-03-20

## 2022-03-17 RX ORDER — LISINOPRIL 10 MG/1
10 TABLET ORAL
Status: DISCONTINUED | OUTPATIENT
Start: 2022-03-18 | End: 2022-03-21 | Stop reason: HOSPADM

## 2022-03-17 RX ORDER — FENTANYL CITRATE 50 UG/ML
50 INJECTION, SOLUTION INTRAMUSCULAR; INTRAVENOUS AS NEEDED
Status: DISCONTINUED | OUTPATIENT
Start: 2022-03-17 | End: 2022-03-17 | Stop reason: HOSPADM

## 2022-03-17 RX ORDER — OXYCODONE HYDROCHLORIDE 5 MG/1
10 TABLET ORAL
Status: DISCONTINUED | OUTPATIENT
Start: 2022-03-17 | End: 2022-03-20

## 2022-03-17 RX ORDER — ONDANSETRON 2 MG/ML
4 INJECTION INTRAMUSCULAR; INTRAVENOUS
Status: ACTIVE | OUTPATIENT
Start: 2022-03-17 | End: 2022-03-18

## 2022-03-17 RX ORDER — PROPOFOL 10 MG/ML
INJECTION, EMULSION INTRAVENOUS
Status: DISCONTINUED | OUTPATIENT
Start: 2022-03-17 | End: 2022-03-17 | Stop reason: HOSPADM

## 2022-03-17 RX ORDER — AMOXICILLIN 250 MG
1 CAPSULE ORAL 2 TIMES DAILY
Status: DISCONTINUED | OUTPATIENT
Start: 2022-03-17 | End: 2022-03-21 | Stop reason: HOSPADM

## 2022-03-17 RX ORDER — SODIUM CHLORIDE, SODIUM LACTATE, POTASSIUM CHLORIDE, CALCIUM CHLORIDE 600; 310; 30; 20 MG/100ML; MG/100ML; MG/100ML; MG/100ML
100 INJECTION, SOLUTION INTRAVENOUS CONTINUOUS
Status: DISCONTINUED | OUTPATIENT
Start: 2022-03-17 | End: 2022-03-17 | Stop reason: HOSPADM

## 2022-03-17 RX ORDER — CELECOXIB 200 MG/1
200 CAPSULE ORAL 2 TIMES DAILY
Status: DISCONTINUED | OUTPATIENT
Start: 2022-03-18 | End: 2022-03-21 | Stop reason: HOSPADM

## 2022-03-17 RX ORDER — SODIUM CHLORIDE 9 MG/ML
125 INJECTION, SOLUTION INTRAVENOUS CONTINUOUS
Status: DISPENSED | OUTPATIENT
Start: 2022-03-17 | End: 2022-03-18

## 2022-03-17 RX ORDER — HYDROMORPHONE HYDROCHLORIDE 1 MG/ML
1 INJECTION, SOLUTION INTRAMUSCULAR; INTRAVENOUS; SUBCUTANEOUS
Status: ACTIVE | OUTPATIENT
Start: 2022-03-17 | End: 2022-03-18

## 2022-03-17 RX ORDER — FENTANYL CITRATE 50 UG/ML
INJECTION, SOLUTION INTRAMUSCULAR; INTRAVENOUS AS NEEDED
Status: DISCONTINUED | OUTPATIENT
Start: 2022-03-17 | End: 2022-03-17 | Stop reason: HOSPADM

## 2022-03-17 RX ORDER — ATORVASTATIN CALCIUM 40 MG/1
80 TABLET, FILM COATED ORAL
Status: DISCONTINUED | OUTPATIENT
Start: 2022-03-17 | End: 2022-03-21 | Stop reason: HOSPADM

## 2022-03-17 RX ORDER — LIDOCAINE HYDROCHLORIDE 10 MG/ML
0.1 INJECTION, SOLUTION EPIDURAL; INFILTRATION; INTRACAUDAL; PERINEURAL AS NEEDED
Status: DISCONTINUED | OUTPATIENT
Start: 2022-03-17 | End: 2022-03-17 | Stop reason: HOSPADM

## 2022-03-17 RX ORDER — EPHEDRINE SULFATE/0.9% NACL/PF 50 MG/5 ML
5 SYRINGE (ML) INTRAVENOUS AS NEEDED
Status: DISCONTINUED | OUTPATIENT
Start: 2022-03-17 | End: 2022-03-17 | Stop reason: HOSPADM

## 2022-03-17 RX ORDER — DEXAMETHASONE SODIUM PHOSPHATE 4 MG/ML
INJECTION, SOLUTION INTRA-ARTICULAR; INTRALESIONAL; INTRAMUSCULAR; INTRAVENOUS; SOFT TISSUE AS NEEDED
Status: DISCONTINUED | OUTPATIENT
Start: 2022-03-17 | End: 2022-03-17 | Stop reason: HOSPADM

## 2022-03-17 RX ORDER — POLYETHYLENE GLYCOL 3350 17 G/17G
17 POWDER, FOR SOLUTION ORAL DAILY
Status: DISCONTINUED | OUTPATIENT
Start: 2022-03-18 | End: 2022-03-21 | Stop reason: HOSPADM

## 2022-03-17 RX ORDER — ONDANSETRON 2 MG/ML
INJECTION INTRAMUSCULAR; INTRAVENOUS AS NEEDED
Status: DISCONTINUED | OUTPATIENT
Start: 2022-03-17 | End: 2022-03-17 | Stop reason: HOSPADM

## 2022-03-17 RX ORDER — FAMOTIDINE 20 MG/1
20 TABLET, FILM COATED ORAL 2 TIMES DAILY
Status: DISCONTINUED | OUTPATIENT
Start: 2022-03-17 | End: 2022-03-21 | Stop reason: HOSPADM

## 2022-03-17 RX ORDER — KETOROLAC TROMETHAMINE 30 MG/ML
15 INJECTION, SOLUTION INTRAMUSCULAR; INTRAVENOUS EVERY 6 HOURS
Status: DISPENSED | OUTPATIENT
Start: 2022-03-17 | End: 2022-03-18

## 2022-03-17 RX ORDER — ASPIRIN 81 MG/1
81 TABLET ORAL 2 TIMES DAILY
Status: DISCONTINUED | OUTPATIENT
Start: 2022-03-17 | End: 2022-03-21 | Stop reason: HOSPADM

## 2022-03-17 RX ORDER — ACETAMINOPHEN 325 MG/1
650 TABLET ORAL
Status: DISCONTINUED | OUTPATIENT
Start: 2022-03-17 | End: 2022-03-18

## 2022-03-17 RX ORDER — CELECOXIB 200 MG/1
200 CAPSULE ORAL 2 TIMES DAILY
Status: DISCONTINUED | OUTPATIENT
Start: 2022-03-17 | End: 2022-03-17

## 2022-03-17 RX ORDER — SODIUM CHLORIDE, SODIUM LACTATE, POTASSIUM CHLORIDE, CALCIUM CHLORIDE 600; 310; 30; 20 MG/100ML; MG/100ML; MG/100ML; MG/100ML
1000 INJECTION, SOLUTION INTRAVENOUS CONTINUOUS
Status: DISCONTINUED | OUTPATIENT
Start: 2022-03-17 | End: 2022-03-17 | Stop reason: HOSPADM

## 2022-03-17 RX ORDER — KETAMINE HYDROCHLORIDE 10 MG/ML
INJECTION, SOLUTION INTRAMUSCULAR; INTRAVENOUS AS NEEDED
Status: DISCONTINUED | OUTPATIENT
Start: 2022-03-17 | End: 2022-03-17 | Stop reason: HOSPADM

## 2022-03-17 RX ORDER — DIPHENHYDRAMINE HYDROCHLORIDE 50 MG/ML
12.5 INJECTION, SOLUTION INTRAMUSCULAR; INTRAVENOUS
Status: ACTIVE | OUTPATIENT
Start: 2022-03-17 | End: 2022-03-18

## 2022-03-17 RX ORDER — HYDROMORPHONE HYDROCHLORIDE 1 MG/ML
0.2 INJECTION, SOLUTION INTRAMUSCULAR; INTRAVENOUS; SUBCUTANEOUS
Status: ACTIVE | OUTPATIENT
Start: 2022-03-17 | End: 2022-03-17

## 2022-03-17 RX ORDER — ONDANSETRON 2 MG/ML
4 INJECTION INTRAMUSCULAR; INTRAVENOUS AS NEEDED
Status: DISCONTINUED | OUTPATIENT
Start: 2022-03-17 | End: 2022-03-17 | Stop reason: HOSPADM

## 2022-03-17 RX ORDER — MIDAZOLAM HYDROCHLORIDE 1 MG/ML
INJECTION, SOLUTION INTRAMUSCULAR; INTRAVENOUS AS NEEDED
Status: DISCONTINUED | OUTPATIENT
Start: 2022-03-17 | End: 2022-03-17 | Stop reason: HOSPADM

## 2022-03-17 RX ORDER — METHOCARBAMOL 500 MG/1
500 TABLET, FILM COATED ORAL
Status: DISCONTINUED | OUTPATIENT
Start: 2022-03-17 | End: 2022-03-21 | Stop reason: HOSPADM

## 2022-03-17 RX ORDER — NALOXONE HYDROCHLORIDE 0.4 MG/ML
0.4 INJECTION, SOLUTION INTRAMUSCULAR; INTRAVENOUS; SUBCUTANEOUS AS NEEDED
Status: DISCONTINUED | OUTPATIENT
Start: 2022-03-17 | End: 2022-03-21 | Stop reason: HOSPADM

## 2022-03-17 RX ORDER — NEOSTIGMINE METHYLSULFATE 1 MG/ML
INJECTION, SOLUTION INTRAVENOUS AS NEEDED
Status: DISCONTINUED | OUTPATIENT
Start: 2022-03-17 | End: 2022-03-17 | Stop reason: HOSPADM

## 2022-03-17 RX ADMIN — SODIUM CHLORIDE, PRESERVATIVE FREE 10 ML: 5 INJECTION INTRAVENOUS at 22:30

## 2022-03-17 RX ADMIN — NEOSTIGMINE METHYLSULFATE 3 MG: 1 INJECTION, SOLUTION INTRAVENOUS at 16:35

## 2022-03-17 RX ADMIN — CEFAZOLIN SODIUM 2 G: 1 INJECTION, POWDER, FOR SOLUTION INTRAMUSCULAR; INTRAVENOUS at 22:30

## 2022-03-17 RX ADMIN — FENTANYL CITRATE 100 MCG: 50 INJECTION, SOLUTION INTRAMUSCULAR; INTRAVENOUS at 14:09

## 2022-03-17 RX ADMIN — HYDROMORPHONE HYDROCHLORIDE 0.5 MG: 2 INJECTION, SOLUTION INTRAMUSCULAR; INTRAVENOUS; SUBCUTANEOUS at 16:02

## 2022-03-17 RX ADMIN — ROCURONIUM BROMIDE 10 MG: 10 SOLUTION INTRAVENOUS at 15:07

## 2022-03-17 RX ADMIN — VECURONIUM BROMIDE 2 MG: 10 INJECTION, POWDER, LYOPHILIZED, FOR SOLUTION INTRAVENOUS at 15:42

## 2022-03-17 RX ADMIN — ROCURONIUM BROMIDE 30 MG: 10 SOLUTION INTRAVENOUS at 14:18

## 2022-03-17 RX ADMIN — ATORVASTATIN CALCIUM 80 MG: 40 TABLET, FILM COATED ORAL at 22:29

## 2022-03-17 RX ADMIN — PROPOFOL 25 MG: 10 INJECTION, EMULSION INTRAVENOUS at 14:01

## 2022-03-17 RX ADMIN — SENNOSIDES AND DOCUSATE SODIUM 1 TABLET: 50; 8.6 TABLET ORAL at 22:30

## 2022-03-17 RX ADMIN — SODIUM CHLORIDE, POTASSIUM CHLORIDE, SODIUM LACTATE AND CALCIUM CHLORIDE 1000 ML: 600; 310; 30; 20 INJECTION, SOLUTION INTRAVENOUS at 12:08

## 2022-03-17 RX ADMIN — GLYCOPYRROLATE 0.4 MG: 0.2 INJECTION, SOLUTION INTRAMUSCULAR; INTRAVENOUS at 16:34

## 2022-03-17 RX ADMIN — SODIUM CHLORIDE 125 ML/HR: 9 INJECTION, SOLUTION INTRAVENOUS at 18:31

## 2022-03-17 RX ADMIN — SODIUM CHLORIDE, POTASSIUM CHLORIDE, SODIUM LACTATE AND CALCIUM CHLORIDE: 600; 310; 30; 20 INJECTION, SOLUTION INTRAVENOUS at 16:55

## 2022-03-17 RX ADMIN — DEXMEDETOMIDINE HYDROCHLORIDE 5 MCG: 100 INJECTION, SOLUTION, CONCENTRATE INTRAVENOUS at 14:02

## 2022-03-17 RX ADMIN — MIDAZOLAM HYDROCHLORIDE 2 MG: 1 INJECTION, SOLUTION INTRAMUSCULAR; INTRAVENOUS at 13:42

## 2022-03-17 RX ADMIN — Medication 1.5 MG: at 22:27

## 2022-03-17 RX ADMIN — ONDANSETRON HYDROCHLORIDE 4 MG: 2 INJECTION, SOLUTION INTRAMUSCULAR; INTRAVENOUS at 13:38

## 2022-03-17 RX ADMIN — FAMOTIDINE 20 MG: 20 TABLET ORAL at 22:28

## 2022-03-17 RX ADMIN — DEXMEDETOMIDINE HYDROCHLORIDE 10 MCG: 100 INJECTION, SOLUTION, CONCENTRATE INTRAVENOUS at 14:56

## 2022-03-17 RX ADMIN — LABETALOL HYDROCHLORIDE 5 MG: 5 INJECTION INTRAVENOUS at 14:53

## 2022-03-17 RX ADMIN — Medication 10 MG: at 14:50

## 2022-03-17 RX ADMIN — WATER 2 G: 1 INJECTION INTRAMUSCULAR; INTRAVENOUS; SUBCUTANEOUS at 14:27

## 2022-03-17 RX ADMIN — OXYCODONE 10 MG: 5 TABLET ORAL at 23:25

## 2022-03-17 RX ADMIN — PROPOFOL 25 MG: 10 INJECTION, EMULSION INTRAVENOUS at 13:50

## 2022-03-17 RX ADMIN — GLYCOPYRROLATE 0.1 MG: 0.2 INJECTION, SOLUTION INTRAMUSCULAR; INTRAVENOUS at 13:38

## 2022-03-17 RX ADMIN — PROPOFOL 25 MG: 10 INJECTION, EMULSION INTRAVENOUS at 13:59

## 2022-03-17 RX ADMIN — KETOROLAC TROMETHAMINE 15 MG: 30 INJECTION, SOLUTION INTRAMUSCULAR; INTRAVENOUS at 17:53

## 2022-03-17 RX ADMIN — ACETAMINOPHEN 650 MG: 325 TABLET ORAL at 12:13

## 2022-03-17 RX ADMIN — PROPOFOL 30 MCG/KG/MIN: 10 INJECTION, EMULSION INTRAVENOUS at 13:50

## 2022-03-17 RX ADMIN — Medication 10 MG: at 13:50

## 2022-03-17 RX ADMIN — ASPIRIN 81 MG: 81 TABLET, COATED ORAL at 22:28

## 2022-03-17 RX ADMIN — FENTANYL CITRATE 25 MCG: 50 INJECTION, SOLUTION INTRAMUSCULAR; INTRAVENOUS at 17:20

## 2022-03-17 RX ADMIN — DEXAMETHASONE SODIUM PHOSPHATE 4 MG: 4 INJECTION, SOLUTION INTRAMUSCULAR; INTRAVENOUS at 14:37

## 2022-03-17 RX ADMIN — LIDOCAINE HYDROCHLORIDE 40 MG: 20 INJECTION, SOLUTION EPIDURAL; INFILTRATION; INTRACAUDAL; PERINEURAL at 14:09

## 2022-03-17 RX ADMIN — PROPOFOL 125 MG: 10 INJECTION, EMULSION INTRAVENOUS at 14:09

## 2022-03-17 RX ADMIN — METOPROLOL TARTRATE 12.5 MG: 25 TABLET, FILM COATED ORAL at 22:28

## 2022-03-17 RX ADMIN — MIDAZOLAM HYDROCHLORIDE 3 MG: 1 INJECTION, SOLUTION INTRAMUSCULAR; INTRAVENOUS at 13:50

## 2022-03-17 NOTE — H&P
PAT Nurse Practitioner   Pre-Operative Chart Review/Assessment:-ORTHOPEDIC                   Patient Name:  Chris Quan                                                           Age:   73 y.o.    :  1955      Today's Date:  3/9/2022      Date of PAT:   3/7/2022       Date of Surgery:    3/17/2022       Procedure(s):  Left Total Hip Arthroplasty      Surgeon:   Dr. Linda Nunes                              PLAN:       1)  Medical Clearance:  Dr. Froylan Gonzalez       2)  Cardiac Clearance:  Pt followed by Dr. Sruthi Martinez H&V). LOV 3/2/22. ECHO 3/14/22. Clearance obtained on 3/16/22. OV note, ECHO and clearance letter on chart. PAT EKG: normal EKG, normal sinus rhythm. 3)  Diabetic Treatment Consult:  Not indicated. A1c-6.1       4)  Sleep Apnea evaluation:   TYLER score of 4. Pt reports daytime fatigue and a diagnosis of HTN. Pt denies loud snoring that can be heard through a closed door and witnessed pauses in breathing. Pt previously referred to sleep medicine.  SS pending.        5) Treatment for MRSA/Staph Aureus:  Neg       6) Additional Concerns:  Former smoker, HTN, PreDM, hx of CVA, CAD/MI s/p stent x 3, obesity                 Vital Signs:              Vitals:     22 0836   BP: 120/75   Pulse: 64   Temp: 98.4 °F (36.9 °C)   Weight: 106 kg (233 lb 11 oz)   Height: 5' 4\" (1.626 m)                ____________________________________________  PAST MEDICAL HISTORY       Past Medical History:   Diagnosis Date    Anemia       HISTORY OF ANEMIA    Arthritis       BACK & HIPS    Cancer ([address]      RIGHT UPPER CHEST    Heart attack ([address]     Hypertension      Prediabetes      Rotator cuff injury       LEFT SIDE    Stroke ([address] 2006      ____________________________________________  PAST SURGICAL HISTORY        Past Surgical History:   Procedure Laterality Date    HX COLONOSCOPY        HX CORONARY STENT PLACEMENT   2016     X3 STENTS    HX ENDOSCOPY        HX ORTHOPAEDIC Right     HX ORTHOPAEDIC Left       FOOT    HX OTHER SURGICAL        HX WISDOM TEETH EXTRACTION        AR CARDIAC SURG PROCEDURE UNLIST         TITANIUM MUSHROOM PATCH IN HEART [facility] (DUE TO A HOLE IN HEART)      ____________________________________________  HOME MEDICATIONS       Current Outpatient Medications   Medication Sig    atorvastatin (LIPITOR) 80 mg tablet Take 80 mg by mouth nightly.  amoxicillin (AMOXIL) 875 mg tablet Take 875 mg by mouth two (2) times a day.  metoprolol tartrate (LOPRESSOR) 25 mg tablet Take 12.5 mg by mouth two (2) times a day.  cholecalciferol, vitamin D3, (VITAMIN D3 PO) Take 1 Capsule by mouth two (2) times a day. 250 MCG    acetaminophen/diphenhydramine (TYLENOL PM PO) Take 2 Capsules by mouth nightly.  lisinopriL (PRINIVIL, ZESTRIL) 10 mg tablet Take 10 mg by mouth every morning.  MELATONIN PO Take 1 Tablet by mouth nightly.  L. rhamnosus GG/inulin (CULTURELLE DIGESTIVE HEALTH PO) Take 1 Tablet by mouth nightly.  aspirin delayed-release 81 mg tablet Take 162 mg by mouth nightly.      No current facility-administered medications for this encounter.      ____________________________________________  ALLERGIES  No Known Allergies   ____________________________________________  SOCIAL HISTORY  Social History            Tobacco Use    Smoking status: Former Smoker       Packs/day: 0.50       Years: 10.00       Pack years: 5.00       Quit date: 2000       Years since quittin.2    Smokeless tobacco: Not on file   Substance Use Topics    Alcohol use:  Yes       Alcohol/week: 2.0 standard drinks       Types: 1 Cans of beer, 1 Shots of liquor per week       Comment: ON OCCASION      ____________________________________________    Internal Administration   First Dose COVID-19, Abi Atwood, Primary or Immunocompromised Series, MRNA, PF, 100mcg/0.5mL  2021   Second Dose COVID-19, Moderna, Primary or Immunocompromised Series, MRNA, PF, 100mcg/0.5mL  01/27/2022       Last COVID Lab Pt to have COVID test done at [facility] and fax results to PAT prior to surgery.         Labs:   [facility] Outpatient Visit on 03/07/2022   Component Date Value Ref Range Status     Crossmatch Expiration 03/07/2022 03/20/2022,2359    Final    ABO/Rh(D) 03/07/2022 O POSITIVE    Final    Antibody screen 03/07/2022 NEG    Final    Color 03/07/2022 DARK YELLOW    Final     Color Reference Range: Straw, Yellow or Dark Yellow    Appearance 03/07/2022 CLEAR  CLEAR   Final    Specific gravity 03/07/2022 >1.030    Final    pH (UA) 03/07/2022 5.5  5.0 - 8.0   Final    Protein 03/07/2022 30* NEG mg/dL Final    Glucose 03/07/2022 Negative  NEG mg/dL Final    Ketone 03/07/2022 TRACE* NEG mg/dL Final    Bilirubin 03/07/2022 Negative  NEG   Final    Blood 03/07/2022 Negative  NEG   Final    Urobilinogen 03/07/2022 1.0  0.2 - 1.0 EU/dL Final    Nitrites 03/07/2022 Negative  NEG   Final    Leukocyte Esterase 03/07/2022 Negative  NEG   Final    WBC 03/07/2022 0-4  0 - 4 /hpf Final    RBC 03/07/2022 0-5  0 - 5 /hpf Final    Epithelial cells 03/07/2022 FEW  FEW /lpf Final     Epithelial cell category consists of squamous cells and /or transitional urothelial cells. Renal tubular cells, if present, are separately identified as such.     Bacteria 03/07/2022 Negative  NEG /hpf Final    UA:UC IF INDICATED 03/07/2022 CULTURE NOT INDICATED BY UA RESULT  CNI   Final    Mucus 03/07/2022 2+* NEG /lpf Final    Hyaline cast 03/07/2022 0-2  0 - 5 /lpf Final    Special Requests: 03/07/2022 NO SPECIAL REQUESTS    Final    Culture result: 03/07/2022 MRSA NOT PRESENT    Final                  Ventricular Rate 03/07/2022 62  BPM Final    Atrial Rate 03/07/2022 62  BPM Final    P-R Interval 03/07/2022 170  ms Final    QRS Duration 03/07/2022 96  ms Final    Q-T Interval 03/07/2022 446  ms Final    QTC Calculation (Bezet) 03/07/2022 452  ms Final    Calculated P Axis 03/07/2022 44  degrees Final    Calculated R Axis 03/07/2022 -12  degrees Final    Calculated T Axis 03/07/2022 7  degrees Final    Diagnosis 03/07/2022     Final                    Value:Normal sinus rhythm     No previous ECGs available  Confirmed by Chapin Vazquez M.D., Gladis Woodard ([id number]) on 3/7/2022 10:13:10 PM            Skin:      Denies open wounds, cuts, sores, rashes or other areas of concern in PAT assessment.             KY De Leon MD - 02/15/2022 1:00 PM EST  Formatting of this note is different from the original.  Images from the original note were not included. CARE TEAM:  Patient Care Team:  Provider Not In System as PCP - General (General Practice)    ASSESSMENT  Diagnosis Plan   1. Primary osteoarthritis of left hip X-ray hip left 2-3 views w/pelvis when performed (99311)   2. Obesity, Class II, BMI 35-39.9     There is no problem list on file for this patient. PLAN  Treatment Plan:   -We discussed continued non-operative management options but the patient has failed conservative managements and would like to proceed forth with surgical treatment.  -We discussed total hip arthroplasty by direct anterior approach (86263). We discussed the risks, benefits, complications and convalescence regarding hip replacement surgery. We discussed bleeding, infection, continued pain, lateral thigh numbness, instability, leg length discrepancy, neurovascular injury, thromboembolic disease and anesthetic complications. We also discussed the different which wean anterior and posterior approaches. She is okay to proceed with the anterior approach on this side. The patient agreed to proceed with surgery.  -The patient was given preoperative clearance instructions. The patient was referred to Patria Menendez. Medical clearance from the patient's Primary Care physician and cardiologist is expected. She is also having dental work done on March 5th.   -we discussed her elevated BMI and advised weight loss through improved diet as much as she can. She is currently within the surgical criteria and was to proceed forth before attempting weight loss. I Advised that the elevated BMI does put her at increased risk for complications including infections  - Planning Left Total Hip arthroplasty by direct anterior approach at [address] [place] [address] This Encounter    X-ray hip left 2-3 views w/pelvis when performed ([id number])    BMI >=25 PATIENT INSTRUCTIONS & EDUCATION     Return for Post-Op Check. HISTORY OF PRESENT ILLNESS  Chief Complaint: Pain of the Left Hip    Age: 73 y.o. Sex: female   Hand-dominance: Right  History of present illness: Ms. Sagrario Chavez presents today for evaluation of left hip pain. It has been chronic in nature, and she describes it in the groin and lateral aspect of the hip. She describes it as a throbbing type pain. She underwent right total hip replacement by Dr. Gilbert Echevarria at [facility] in 2013 by posterior approach. She did very well, with immediate relief of her pain and a smooth recovery. The right hip began the same type of pain but progressed to the point where she could not sit still and she does not want to progress that far. She saw his partner now that he is retired, and did not appreciate the discussion and warnings that he gave her. She has tried oral anti-inflammatories and activity modifications. She has difficulty putting on socks and shoes on the left side due to the decreased mobility. Pain rating = 5 out of 10     OBJECTIVE  Constitutional: No acute distress. Her body mass index is 39.33 kg/m². Eyes: Sclera are nonicteric. Respiratory: No labored breathing. Cardiovascular: No marked edema. Skin: No marked skin ulcers. Neurological: No marked sensory loss noted. Psychiatric: Alert and oriented x3.     Left Hip Exam     Muscle Strength   Flexion: 5/5/5     Back Exam     Muscle Strength   Left Quadriceps: 5/5/5     Inspection:  Gait: antalgic. Ambulatory aid: None    Left Hip Exam:    ROM:  Flexion: 110  Extension: 20  Internal Rotation: 10  External Rotation: 45    SLR: Positive  Pain: with IR  Tenderness to palpation: None    Strength:  Hip Flexor: 5/5  Quad: 5/5  Anterior Tibialis: 5/5  Extensor Hallucis Longus: 5/5    Neurovascular:  Pulses: Intact  Sensation: Grossly Intact  Edema: None    Right Hip Exam:    ROM:  Flexion: 120  Extension: 30  Internal Rotation: 30  External Rotation: 45    IMAGING / STUDIES  Order: XR HIP LEFT 2-3 VIEWS W/ PELVIS WHEN PERFORMED - Indication:   Primary osteoarthritis of left hip    X-ray hip left 2-3 views w/pelvis when performed ([id number])    Result Date: 2/15/2022  AP Pelvis, Frog Lateral.     Impression: 2 view xrays of the left hip show severe osteoarthritis with joint space narrowing, femoral head irregularity, osteophyte formation, acetabular changes, subchondral sclerosis and cyst formation. There is no evidence of fractures, avascular necrosis or other abnormality. Leg lengths appear nearly symmetric at the level of the hip. She has a contralateral total hip arthroplasty in good alignment without evidence of complication.  Compared to previous x-ray December 2020 which she brought on a disc, there has been advancement of the arthritis    PROCEDURES  Procedures    Clinton Porter MD    Electronically signed by Clinton Porter MD at 02/15/2022 5:28 PM EST

## 2022-03-17 NOTE — ANESTHESIA POSTPROCEDURE EVALUATION
Post-Anesthesia Evaluation and Assessment    Patient: Zia Toro MRN: [id number]  SSN: [id number]    YOB: 1955  Age: 73 y.o. Sex: female      I have evaluated the patient and they are stable and ready for discharge from the PACU. Cardiovascular Function/Vital Signs  Visit Vitals  /61   Pulse 74   Temp 37 °C (98.6 °F)   Resp 9   Wt 105.7 kg (233 lb)   SpO2 99%   BMI 39.99 kg/m²       Patient is status post General anesthesia for Procedure(s):  LEFT TOTAL HIP ARTHROPLASTY ANTERIOR APPROACH. Nausea/Vomiting: None    Postoperative hydration reviewed and adequate. Pain:  Pain Scale 1: Numeric (0 - 10) (03/17/22 1720)  Pain Intensity 1: 4 (03/17/22 1720)   Managed    Neurological Status:   Neuro (WDL): Exceptions to WDL (03/17/22 1655)  Neuro  Neurologic State: Sleeping (03/17/22 1655)  LUE Motor Response:  unequal R greater than L;Tingling;Numbness (03/17/22 1153)  LLE Motor Response: Weak (03/17/22 1153)   At baseline    Mental Status, Level of Consciousness: Alert and  oriented to person, place, and time    Pulmonary Status:   O2 Device: None (03/17/22 1656)   Adequate oxygenation and airway patent    Complications related to anesthesia: None    Post-anesthesia assessment completed. No concerns    Signed By: Rajeev Bonilla MD     March 17, 2022              Procedure(s):  LEFT TOTAL HIP ARTHROPLASTY ANTERIOR APPROACH. general    <BSHSIANPOST>    INITIAL Post-op Vital signs:   Vitals Value Taken Time   /58 03/17/22 1730   Temp 37 °C (98.6 °F) 03/17/22 1656   Pulse 74 03/17/22 1732   Resp 11 03/17/22 1732   SpO2 94 % 03/17/22 1732   Vitals shown include unvalidated device data.

## 2022-03-17 NOTE — ANESTHESIA PREPROCEDURE EVALUATION
Relevant Problems   No relevant active problems       Anesthetic History   No history of anesthetic complications            Review of Systems / Medical History  Patient summary reviewed, nursing notes reviewed and pertinent labs reviewed    Pulmonary  Within defined limits                 Neuro/Psych       CVA       Cardiovascular    Hypertension          Past MI, CAD and cardiac stents         GI/Hepatic/Renal  Within defined limits              Endo/Other    Diabetes    Morbid obesity, arthritis, cancer and anemia     Other Findings              Physical Exam    Airway  Mallampati: II  TM Distance: > 6 cm  Neck ROM: normal range of motion   Mouth opening: Normal     Cardiovascular  Regular rate and rhythm,  S1 and S2 normal,  no murmur, click, rub, or gallop             Dental  No notable dental hx       Pulmonary  Breath sounds clear to auscultation               Abdominal  GI exam deferred       Other Findings            Anesthetic Plan    ASA: 3  Anesthesia type: spinal            Anesthetic plan and risks discussed with: Patient

## 2022-03-17 NOTE — OP NOTES
Operative Report    Patient Name: Juan Gunn    Patient YOB: 1955    [facility] MRN: [id number]    Date of Procedure: 03/17/22    Surgical Location: [facility]    Preoperative diagnosis: Left hip osteoarthritis    Postoperative diagnosis: Left hip osteoarthritis    Procedure: Left total hip arthroplasty by Direct Anterior approach    Surgeon: Liss Betts MD    Assistant/Staff: Circ-1: Esther Cisneros  Circ-Relief: Galo Ma RN; Cheryle Falcon, RN  Circ-Intern: Jr Robbins RN  Scrub Tech-1: Lee Gurrola  Scrub Tech-2: Jose Gay  Scrub Tech-Relief: Gerardo Hagen  Retractor Smith: Jenny Mitjanis  Surg Asst-1: Slava Redhead    Anesthesia: General    Preoperative IV Antibiotics: Ancef 2g    Findings: acetabular and femoral head osteoarthritis    Estimated Blood Loss: 300 ml    Implants: Depuy Kotlik Acetabular component size 52 mm, Polyethylene insert, Femoral head ceramic Biolox delta size 36 + 8mm, Actis Femoral component Standard Offset size 7      Specimens: None    Complications: None    Disposition: PACU - hemodynamically stable. Condition: stable      Indications for procedure: The patient presented to my office on an outpatient basis complaining of left hip pain. Evaluation revealed osteoarthritis and the patient failed non-operative management. We discussed nonsurgical and surgical management options and chose to undergo total hip arthroplasty by direct anterior approach. We discussed the risks and benefits, including leg length discrepancy, lateral thigh numbness, infection, blood loss, blood clot, continued pain, anesthetic complications including death. The patient elected to proceed forth with surgery. Description of procedure: The patient was met in the preoperative holding area. The appropriate surgical site was marked. The patient signed a consent form to proceed with surgery.  The patient was brought into the operating room and given general anesthesia. Spinal was attempted unsuccessfully. The patient was placed on the [place] table. The left hip was prepped and draped in the usual sterile fashion. The pannus was taped to the contralateral side. A multidisciplinary timeout was performed and IV antibiotics were dosed. Xray fluoroscopy was used throughout the case for implant alignment. I began with a direct anterior approach to the left hip. An incision was made directly over the tensor fascia sofi and subcutaneous dissection was performed until I arrived at the fascia. Muscle belly was visible underneath the fascia and confirmed its direction to the ASIS. The fascia was incised directly over the tensor fascia sofi and the muscle was bluntly dissected from the medial fascia using my finger. Retractors were placed over the femoral neck. The rectus was elevated from the anterior capsule. A capsular incision was made along the femoral neck and medial and lateral capsule was tagged. Blunt retractors were placed intra-capsular and the femoral neck was inspected. After confirming appropriate femoral neck cut on x-ray below the fracture line, a sagittal saw was used to make the femoral neck osteotomy and a napkin ring cut. The femoral head was removed from the acetabulum and measured for sizing. Acetabular retractors were placed and the labrum and pulvinar were excised. Reamers were sequentially used to medialized and then expand coverage under fluoroscopic guidance. Appropriate orientation of the remears were done under fluoroscopy to a goal of 20° anteversion and 40° abduction. When appropriate cancellous bleeding and acetabular fill was achieved, the acetabular component was selected and placed with Press-Fit fixation. The acetabular component was visually down to bone and had excellent fixation. The final polyethylene liner was inserted. Next, we turned our attention to the femoral component.   The femoral neck was delivered through the incision after releasing the posterior capsule and elevating with retractors. The leg was placed in external rotation, full extension, and adduction. I opened the metaphysis using a rongeur followed by the entry broach. I then inserted the sucker tip intramedullary in order to provide a path for sequential broaching and ensure no cortical penetration. I broached sequentially up to the appropriate size. The hip was reduced and found to have good sizing and comparative leg length. Stability checks using external rotation to 90° at a neutral position followed by external rotation to 90° with hip extension showed no anterior subluxation or dislocation. The final femoral stem was inserted and the process was repeated using a trial femoral head. Again I confirmed stability and implant positioning fluoroscopically before placing the final femoral head. Leg lengths appeared nearly symmetric based on xray. I placed the final femoral head and performed a final imaging and stability check. The wound was copiously irrigated with normal saline, A 3-minute betadine soak was performed. A topical application of tranexamic acid was given. A capsular injection was given. The fascia was closed with 0 Vicryl suture and #1 Strata Fix running suture. The skin was closed with 2-0 Vicryl, followed by 3-0 Vicryl subcuticularly. The wound was covered with Dermabond followed by AquacelAg after drying. Surgical counts were correct at the end of the procedure. The patient was transferred from the table to the postoperative bed and awakened from anesthesia. The patient was brought to the postanesthesia care unit in stable condition. Postoperative care: The left lower extremity will be weight-bearing as tolerated. Anterior hip precautions should be observed. Physical therapy will be consulted for gait training and discharge planning. DVT prophylaxis will be given for 6 weeks.  Follow-up in office 10-14 days after discharge.         Allegra Guzman MD      03/17/22

## 2022-03-17 NOTE — PERIOP NOTES
TRANSFER - OUT REPORT:    Verbal report given to [facility], RN (name) on Yaa Cotter  being transferred to 56(unit) for routine post - op       Report consisted of patients Situation, Background, Assessment and   Recommendations(SBAR). Time Pre op antibiotic given:14:27 Ancef  Anesthesia Stop time: 16:59  Jeffrey Present on Transfer to floor:no  Order for Jeffrey on Chart:no  Discharge Prescriptions with Chart:no    Information from the following report(s) SBAR, Kardex, OR Summary, Procedure Summary, Intake/Output, MAR, Recent Results, Med Rec Status and Cardiac Rhythm SR was reviewed with the receiving nurse. Opportunity for questions and clarification was provided. Is the patient on 02? NO       L/Min        Other     Is the patient on a monitor? NO    Is the nurse transporting with the patient? NO    Surgical Waiting Area notified of patient's transfer from PACU? YES      The following personal items collected during your admission accompanied patient upon transfer:   Dental Appliance: Dental Appliances: None  Vision: Visual Aid: Glasses  Hearing Aid:    Jewelry: Jewelry: None  Clothing: Clothing:  (glasses,cane,walker+bag of clothing returned to pt.)  Other Valuables:  Other Valuables: Eyeglasses (Glasses to [employer])  Valuables sent to safe:

## 2022-03-17 NOTE — INTERVAL H&P NOTE
Update History & Physical    The Patient's History and Physical of March 16, 2022 was reviewed with the patient and I examined the patient. There was no change. The surgical site was confirmed by the patient and me. Plan:  The risk, benefits, expected outcome, and alternative to the recommended procedure have been discussed with the patient. Patient understands and wants to proceed with the procedure.     Electronically signed by Luz Maria Pineda MD on 3/17/2022 at 12:50 PM

## 2022-03-17 NOTE — DISCHARGE INSTRUCTIONS
Dr. Mayco Birmingham Total Hip Replacement Postoperative Instructions    Follow-Up Appointment:  o Follow up in the office 2 weeks after surgery. If this appointment is not already scheduled, please call our office at [phone].  Activity:  o Unless you have been specifically instructed otherwise, you can advance your activity as tolerated. o Ambulate every hour. Use your incentive spirometer every half hour when resting. o Perform your exercises as often as possible, at least 3 times a day.  o Avoid extremes of motion and vigorous activities. o Hip Precautions: Avoid planting your operative foot and rotating (turning) at the hip. Also, keep your toes pointing relatively straightforward without excessive inward or outward turn. Avoid bringing your knee past your belly button. o Avoid low seats, recliners, and bleachers for the first 6-8 weeks  o You may bear weight fully on your operative extremity with a walker and transition to a cane as soon as you feel comfortable and strong enough. That being said, advance your activity in a stepwise and cautious manner. You will likely feel better than your replaced joint is ready for.    o Refrain from any high-level activities until we see you back at your follow up appointment. This includes golfing, exercising, and other more intense activities. o Prevent any falls. Clear your living environment of rugs or floor objects that may be tripping hazards. Use an assistive device as needed for balance and support.  o Application of the ice packs will prevent and treat inflammation and reduce pain and swelling. You should ice the incisional area at least 3x/day, for 20-30 minutes.  Dressings / Wound Care:  o Keep dressing in place until the follow-up visit.   o Do not apply antibiotic ointment, creams or lotions to your incision.  o Once your waterproof dressing has been removed, you may change bandages daily if you like or leave them open to air.   These can be purchased at your local pharmacy. o Most incisions are closed with absorbable sutures, but if not, the sutures or staples will be removed at your 2 week follow up.  o Dermabond (skin glue) may be used to help close the incision, which appears as a thin, transparent covering over the incision. Do not pick or pull at this covering, this will fall off naturally within 2-3 weeks. o I recommend using Compression stockings as much as tolerated for 2-4 weeks after surgery to reduce swelling and return blood flow. They can be removed for showering or if needed for breaks. o If there is continued drainage or you are concerned contact Dr. Daniel Baez office at ([phone] / Jorge Langford:  o If your incision is dry without drainage, you may shower following your discharge home. The dressing is waterproof if well affixed to skin.  o You may shower with the waterproof dressing in place, but please be sure it is adhered to the skin and does not allow water to get underneath.   o It is fine to have water run over the incision after the waterproof dressing has been removed. o Do not vigorously scrub your incision. o Do not soak or submerge in a bath, pool, jacuzzi, ocean, river or lake for 6 weeks after surgery.  Diet:  o You may advance to your regular diet as tolerated. o Proper nutrition is crucial to healing. Make sure you are eating as healthily as possible and following a balanced. protein-rich diet after your surgery. o Avoid processed foods and eat fruits and vegetables.  Medications:  o It is our goal to keep you as comfortable as possible after your surgery. Please take your medications as prescribed without exceeding the recommended dosage. o It is also important to understand that pain has a cycle. It begins and increases until medication interrupts it. The aim of good pain control is to stop the pain before it becomes intolerable.   The key is to stay ahead of the pain.  o We encourage patients to discontinue opioid pain medications as soon as possible after surgery. o The side effects of these medications can be substantial, and the narcotic medications are not mandatory. You may substitute a prescribed narcotic with over-the-counter Tylenol. o Pain medications may cause constipation. Over-the-counter Colace twice daily and Miralax while taking the narcotic medication should help prevent constipation. o Other side effects of pain medication include dizziness, headache, nausea, vomiting, and urinary retention. o Discontinue the pain medication if you develop itching, rash, shortness of breath, or difficulties swallowing. If these symptoms become severe or are not relieved by discontinuing the medication, you should seek immediate medical attention. o Refills of pain medication are authorized during office hours only (8 AM- 4 PM Monday through Friday). Our office will not prescribe narcotics beyond 6 weeks from the date of your surgery. o Narcotics will not be called into the pharmacy, and, in most cases, you must be seen clinically.  Blood Thinners:  o You will be given a prescription for either Aspirin or Xarelto based on your health history. If you are on a blood thinner pre surgery, they will continue following surgery for prophylaxis.  o You should take these medications as prescribed for 6 weeks following your surgery.  Driving:  o You should not return to driving until you are off all narcotic pain medications and able to safely and quickly apply the brakes. This is normally 2-4 weeks for left sided joint replacements and 2-6 weeks for right-sided joint replacements.  Airports and metal detectors:  o ID cards are no longer given after joint replacement, as they are not accepted by TSA security. Most joint replacements do not set off metal detectors. If the detector is set off, just tell the  you have a joint replacement.    Signs/Symptoms of Concern: o Contact Dr. Hima Lomas office if any of the following signs or symptoms develop. Please be advised if a problem arises which you feel requires immediate medical attention you should seek medical attention at the closest ER.  - Temperature greater than 101.5 for more than 8 hrs  - Fever and/or chills that persist for greater than 8 hr.  - A sudden increase in pain/swelling/ or tenderness in the back of your calf or thigh that is not relieved with ice/elevation and pain medication. o Increased drainage from your incision, increased redness or warmth at the area of your incision or a sudden increase in swelling or redness that persists despite ice and elevation      If you have questions or concerns, please call Dr. Hima Lomas staff    Office: Phone [phone]  Fax [phone]    Office Address: Kasi Hector M.D.     [address] [place] [address]    Kasi Hector MD      03/17/22

## 2022-03-18 LAB
ANION GAP SERPL CALC-SCNC: 5 MMOL/L (ref 5–15)
BUN SERPL-MCNC: 14 MG/DL (ref 6–20)
BUN/CREAT SERPL: 17 (ref 12–20)
CALCIUM SERPL-MCNC: 8.5 MG/DL (ref 8.5–10.1)
CHLORIDE SERPL-SCNC: 104 MMOL/L (ref 97–108)
CO2 SERPL-SCNC: 25 MMOL/L (ref 21–32)
CREAT SERPL-MCNC: 0.81 MG/DL (ref 0.55–1.02)
GLUCOSE SERPL-MCNC: 131 MG/DL (ref 65–100)
HGB BLD-MCNC: 10.1 G/DL (ref 11.5–16)
POTASSIUM SERPL-SCNC: 4.6 MMOL/L (ref 3.5–5.1)
SODIUM SERPL-SCNC: 134 MMOL/L (ref 136–145)

## 2022-03-18 PROCEDURE — 97530 THERAPEUTIC ACTIVITIES: CPT

## 2022-03-18 PROCEDURE — 97162 PT EVAL MOD COMPLEX 30 MIN: CPT

## 2022-03-18 PROCEDURE — 74011250637 HC RX REV CODE- 250/637: Performed by: ORTHOPAEDIC SURGERY

## 2022-03-18 PROCEDURE — 74011000250 HC RX REV CODE- 250: Performed by: ORTHOPAEDIC SURGERY

## 2022-03-18 PROCEDURE — G0378 HOSPITAL OBSERVATION PER HR: HCPCS

## 2022-03-18 PROCEDURE — 97535 SELF CARE MNGMENT TRAINING: CPT

## 2022-03-18 PROCEDURE — 36415 COLL VENOUS BLD VENIPUNCTURE: CPT

## 2022-03-18 PROCEDURE — 97116 GAIT TRAINING THERAPY: CPT

## 2022-03-18 PROCEDURE — 97165 OT EVAL LOW COMPLEX 30 MIN: CPT

## 2022-03-18 PROCEDURE — 51798 US URINE CAPACITY MEASURE: CPT

## 2022-03-18 PROCEDURE — 74011250636 HC RX REV CODE- 250/636: Performed by: ORTHOPAEDIC SURGERY

## 2022-03-18 PROCEDURE — 80048 BASIC METABOLIC PNL TOTAL CA: CPT

## 2022-03-18 PROCEDURE — 74011250637 HC RX REV CODE- 250/637

## 2022-03-18 PROCEDURE — 77030041034 HC KT HIP PATT -B

## 2022-03-18 PROCEDURE — 85018 HEMOGLOBIN: CPT

## 2022-03-18 RX ORDER — ASPIRIN 81 MG/1
81 TABLET ORAL 2 TIMES DAILY
Qty: 60 TABLET | Refills: 0 | Status: SHIPPED | OUTPATIENT
Start: 2022-03-18 | End: 2022-04-17

## 2022-03-18 RX ORDER — METHOCARBAMOL 500 MG/1
500 TABLET, FILM COATED ORAL
Qty: 30 TABLET | Refills: 0 | Status: SHIPPED | OUTPATIENT
Start: 2022-03-18

## 2022-03-18 RX ORDER — POLYETHYLENE GLYCOL 3350 17 G/17G
17 POWDER, FOR SOLUTION ORAL DAILY
Qty: 14 PACKET | Refills: 0 | Status: SHIPPED | OUTPATIENT
Start: 2022-03-19 | End: 2022-04-02

## 2022-03-18 RX ORDER — ACETAMINOPHEN 325 MG/1
650 TABLET ORAL EVERY 6 HOURS
Status: DISCONTINUED | OUTPATIENT
Start: 2022-03-18 | End: 2022-03-21 | Stop reason: HOSPADM

## 2022-03-18 RX ORDER — FAMOTIDINE 20 MG/1
20 TABLET, FILM COATED ORAL 2 TIMES DAILY
Qty: 60 TABLET | Refills: 0 | Status: SHIPPED | OUTPATIENT
Start: 2022-03-18 | End: 2022-04-17

## 2022-03-18 RX ORDER — OXYCODONE HYDROCHLORIDE 5 MG/1
5 TABLET ORAL
Qty: 40 TABLET | Refills: 0 | Status: SHIPPED | OUTPATIENT
Start: 2022-03-18 | End: 2022-03-18 | Stop reason: SDUPTHER

## 2022-03-18 RX ORDER — OXYCODONE HYDROCHLORIDE 5 MG/1
5 TABLET ORAL
Qty: 40 TABLET | Refills: 0 | Status: SHIPPED | OUTPATIENT
Start: 2022-03-18 | End: 2022-03-21

## 2022-03-18 RX ORDER — AMOXICILLIN 250 MG
1 CAPSULE ORAL 2 TIMES DAILY
Qty: 28 TABLET | Refills: 0 | Status: SHIPPED | OUTPATIENT
Start: 2022-03-18 | End: 2022-04-01

## 2022-03-18 RX ORDER — CELECOXIB 200 MG/1
200 CAPSULE ORAL 2 TIMES DAILY
Qty: 60 CAPSULE | Refills: 2 | Status: SHIPPED | OUTPATIENT
Start: 2022-03-18 | End: 2022-06-16

## 2022-03-18 RX ADMIN — LISINOPRIL 10 MG: 10 TABLET ORAL at 08:45

## 2022-03-18 RX ADMIN — Medication 1 CAPSULE: at 08:46

## 2022-03-18 RX ADMIN — FAMOTIDINE 20 MG: 20 TABLET ORAL at 19:00

## 2022-03-18 RX ADMIN — ASPIRIN 81 MG: 81 TABLET, COATED ORAL at 08:46

## 2022-03-18 RX ADMIN — FAMOTIDINE 20 MG: 20 TABLET ORAL at 08:46

## 2022-03-18 RX ADMIN — KETOROLAC TROMETHAMINE 15 MG: 30 INJECTION, SOLUTION INTRAMUSCULAR; INTRAVENOUS at 01:01

## 2022-03-18 RX ADMIN — Medication 1.5 MG: at 21:26

## 2022-03-18 RX ADMIN — ASPIRIN 81 MG: 81 TABLET, COATED ORAL at 19:00

## 2022-03-18 RX ADMIN — ACETAMINOPHEN 650 MG: 325 TABLET ORAL at 15:30

## 2022-03-18 RX ADMIN — ATORVASTATIN CALCIUM 80 MG: 40 TABLET, FILM COATED ORAL at 21:26

## 2022-03-18 RX ADMIN — CELECOXIB 200 MG: 200 CAPSULE ORAL at 19:00

## 2022-03-18 RX ADMIN — SODIUM CHLORIDE 125 ML/HR: 9 INJECTION, SOLUTION INTRAVENOUS at 02:39

## 2022-03-18 RX ADMIN — ACETAMINOPHEN 650 MG: 325 TABLET ORAL at 19:00

## 2022-03-18 RX ADMIN — SODIUM CHLORIDE, PRESERVATIVE FREE 10 ML: 5 INJECTION INTRAVENOUS at 15:40

## 2022-03-18 RX ADMIN — POLYETHYLENE GLYCOL 3350 17 G: 17 POWDER, FOR SOLUTION ORAL at 08:45

## 2022-03-18 RX ADMIN — METOPROLOL TARTRATE 12.5 MG: 25 TABLET, FILM COATED ORAL at 08:46

## 2022-03-18 RX ADMIN — OXYCODONE 10 MG: 5 TABLET ORAL at 05:26

## 2022-03-18 RX ADMIN — CEFAZOLIN SODIUM 2 G: 1 INJECTION, POWDER, FOR SOLUTION INTRAMUSCULAR; INTRAVENOUS at 05:26

## 2022-03-18 RX ADMIN — SODIUM CHLORIDE, PRESERVATIVE FREE 10 ML: 5 INJECTION INTRAVENOUS at 05:26

## 2022-03-18 RX ADMIN — SENNOSIDES AND DOCUSATE SODIUM 1 TABLET: 50; 8.6 TABLET ORAL at 08:46

## 2022-03-18 RX ADMIN — OXYCODONE 5 MG: 5 TABLET ORAL at 19:33

## 2022-03-18 RX ADMIN — SENNOSIDES AND DOCUSATE SODIUM 1 TABLET: 50; 8.6 TABLET ORAL at 19:00

## 2022-03-18 RX ADMIN — OXYCODONE 10 MG: 5 TABLET ORAL at 11:19

## 2022-03-18 RX ADMIN — SODIUM CHLORIDE, PRESERVATIVE FREE 10 ML: 5 INJECTION INTRAVENOUS at 21:35

## 2022-03-18 NOTE — PROGRESS NOTES
NAVEED: The patient plans to discharge home with [facility] and friend to transport when stable for discharge. RUR: N/A    1. The patient is from home and lives with friend. 2. Orthopedics, PT/OT following. 3. The patient plans to discharge home. Medicare Outpatient Observation Notice (MOON)/ [place] Outpatient Observation Notice (Ancel Cords) provided to patient/representative with verbal explanation of the notice. Time allotted for questions regarding the notice. Patient /representative provided a completed copy of the MOON/VOON notice. Copy placed on bedside chart. Care Management Interventions  PCP Verified by CM: Yes  Palliative Care Criteria Met (RRAT>21 & CHF Dx)?: No  Mode of Transport at Discharge: Other (see comment)  Transition of Care Consult (CM Consult): Home Health  Reason Outside [place]: Physician referred to specific agency  Physical Therapy Consult: Yes  Occupational Therapy Consult: Yes  Speech Therapy Consult: No  Support Systems: Spouse/Significant Other  Confirm Follow Up Transport: Family  The Plan for Transition of Care is Related to the Following Treatment Goals : The patient plans to discharge home. The Patient and/or Patient Representative was Provided with a Choice of Provider and Agrees with the Discharge Plan?: Yes  Freedom of Choice List was Provided with Basic Dialogue that Supports the Patient's Individualized Plan of Care/Goals, Treatment Preferences and Shares the Quality Data Associated with the Providers?: Yes   Resource Information Provided?: No  Discharge Location  Patient Expects to be Discharged to[de-identified] Home with home health     Reason for Admission:  Left Total Hip                     RUR Score:   N/A                  Plan for utilizing home health:        The Plan for Transition of Care is related to the following treatment goals: Home Health    The Patient and/or patient representative Codie Lu was provided with a choice of provider and agrees   with the discharge plan. [x] Yes [] No    Freedom of choice list was provided with basic dialogue that supports the patient's individualized plan of care/goals, treatment preferences and shares the quality data associated with the providers. [x] Yes [] No       PCP: First and Last name:  Ramos Fay MD     Name of Practice:    Are you a current patient: Yes/No: Y Approximate date of last visit: March 2022   Can you participate in a virtual visit with your PCP: Y                    Current Advanced Directive/Advance Care Plan: No Order      Healthcare Decision Maker:   Click here to complete [address] including selection of the Healthcare Decision Maker Relationship (ie \"Primary\")             Primary Decision Maker: [facility] - [phone]                  Transition of Care Plan:        CM met with the patient in room 566. The patient is alert and oriented x4. Confirmed demographics. The patient is independent with ADL's/IADl's, drives a vehicle, owns a rolling walker and [facility] [address] [facility]. The patient plans to discharge home with home health and friend to transport when stable for discharge. CM following for discharge needs.     Brian Evans RN/CRM

## 2022-03-18 NOTE — PROGRESS NOTES
Patient is alert and oriented x4, B/P is on the low side. MD made aware new  order at this time. Will continues to monitor and encourage ambulate. Call back if it get too low.

## 2022-03-18 NOTE — PROGRESS NOTES
Physical Therapy Note    Patient is awaiting pain medication and demonstrates of soft BP. Will defer PT tx at this time.

## 2022-03-18 NOTE — PROGRESS NOTES
Problem: Self Care Deficits Care Plan (Adult)  Goal: *Acute Goals and Plan of Care (Insert Text)  Description: FUNCTIONAL STATUS PRIOR TO ADMISSION: Patient was modified independent using a SPC for functional mobility. HOME SUPPORT: The patient lived with roommate but did not require assist.    Occupational Therapy Goals  Initiated 3/18/2022  1. Patient will perform lower body ADLs with AE supervision/set-up within 4 day(s). 2.  Patient will perform upper body ADLs standing 5 mins without fatigue or LOB with supervision/set-up within 4 day(s). 3.  Patient will perform toilet transfer with supervision/set-up within 4 day(s). 4.  Patient will perform all aspects of toileting with supervision/set-up within 4 day(s). 5.  Patient will participate in upper extremity therapeutic exercise/activities with supervision/set-up for 10 minutes within 4 day(s). 6.  Patient will utilize energy conservation techniques during functional activities without cues within 4 day(s). Outcome: Not Met   OCCUPATIONAL THERAPY EVALUATION  Patient: Georgie Valdez (89 y.o. female)  Date: 3/18/2022  Primary Diagnosis: Osteoarthritis of left hip [M16.12]  S/P total left hip arthroplasty [Z96.642]  Procedure(s) (LRB):  LEFT TOTAL HIP ARTHROPLASTY ANTERIOR APPROACH (Left) 1 Day Post-Op   Precautions: L LARA,  WBAT,Fall    ASSESSMENT  Based on the objective data described below, the patient presents with impaired activity tolerance, mobility, standing balance, and L hip pain s/p L LARA POD 1. Nursing cleared for therapy and received in chair following lunch. Hx of L rotator cuff tear with impaired AROM. Sit > stand with min A and good static balance with constant BUE support. Attempted to ambulate to bathroom however increased pain with L LE WB. Ambulated 5 ft with RW and brought up BSC. She was able to complete urine hygiene with CGA and posterior hygiene with max A.   Ambulated to bed with CGA and slow pace, good static sitting balance and max A for sit > supine. Left in bed. Recommend ed on dressing aides and progression to bathroom for toileting in next OT session. Issued hip kit and left in room. Current Level of Function Impacting Discharge (ADLs/self-care): UB care set up, LB care max A, self care transfer min A    Functional Outcome Measure: The patient scored 50/100 on the Barthel Index outcome measure which is indicative of moderate impairment with ADL tasks. Other factors to consider for discharge: Patient rents a room from a friend and was mod indep for ADL tasks, IADL tasks(including pet care). She is highly motivated. Anticipate good progression with pain mgmt and acute therapy. Patient will benefit from skilled therapy intervention to address the above noted impairments. PLAN :  Recommendations and Planned Interventions: self care training, functional mobility training, therapeutic exercise, balance training, therapeutic activities, endurance activities, patient education, home safety training, and family training/education    Frequency/Duration: Patient will be followed by occupational therapy 5 times a week to address goals. Recommendation for discharge: (in order for the patient to meet his/her long term goals)  Occupational therapy at least 2 days/week in the home AND ensure assist and/or supervision for safety with higher level ADL/IADLs    This discharge recommendation:  Has been made in collaboration with the attending provider and/or case management    IF patient discharges home will need the following DME: AE: long handled bathing, AE: long handled dressing(issued), and shower chair       SUBJECTIVE:   Patient stated I don't remember it being this bad from my last hip.     OBJECTIVE DATA SUMMARY:   HISTORY:   Past Medical History:   Diagnosis Date    Anemia     HISTORY OF ANEMIA    Arthritis     BACK & HIPS    Cancer ([facility] [address] 2021    RIGHT UPPER CHEST    Heart attack ([facility] [address] 2016 Hypertension     Prediabetes     Rotator cuff injury     LEFT SIDE    Stroke ([facility] [address] 01/04/2006     Past Surgical History:   Procedure Laterality Date    HX COLONOSCOPY      HX CORONARY STENT PLACEMENT  2016    X3 STENTS    HX ENDOSCOPY      HX ORTHOPAEDIC Right 2015    HX ORTHOPAEDIC Left     FOOT    HX OTHER SURGICAL      HX WISDOM TEETH EXTRACTION      MO CARDIAC SURG PROCEDURE UNLIST      TITANIUM MUSHROOM PATCH IN HEART MCV/VCU (DUE TO A HOLE IN HEART)       Expanded or extensive additional review of patient history:     Home Situation  Home Environment: Private residence  # Steps to Enter: 3  Rails to Enter: Yes  One/Two Story Residence: One story  Living Alone: No  Support Systems: Spouse/Significant Other  Patient Expects to be Discharged to[de-identified] Home with home health      EXAMINATION OF PERFORMANCE DEFICITS:  Cognitive/Behavioral Status:  Neurologic State: Alert; Appropriate for age               Range of Motion:  AROM: Grossly decreased, non-functional (L total rotator cuff tear)  PROM: Grossly decreased, non-functional (L shoulder)                      Strength:  Strength: Generally decreased, functional                Coordination:  Coordination: Within functional limits  Fine Motor Skills-Upper: Left Intact; Right Intact    Gross Motor Skills-Upper: Left Intact; Right Intact    Tone & Sensation:  Tone: Normal  Sensation: Intact                      Balance:  Sitting: Intact; With support  Standing: Impaired; With support  Standing - Static: Constant support; Fair  Standing - Dynamic : Constant support; Fair    Functional Mobility and Transfers for ADLs:  Bed Mobility:  Supine to Sit: Maximum assistance;Bed Modified; Additional time  Sit to Supine: Maximum assistance  Scooting:  Moderate assistance    Transfers:  Sit to Stand: Minimum assistance  Stand to Sit: Minimum assistance  Bed to Chair: Minimum assistance  Bathroom Mobility: Minimum assistance (to BSC approx 5 ft)  Toilet Transfer : Minimum assistance (approx 5 ft)    ADL Assessment:  Patient recalled and demonstrated avoiding extreme planes of movement with Left LE during ADLs and functional mobility with min cues. Feeding: Setup    Oral Facial Hygiene/Grooming: Setup;Supervision (seated)    Bathing: Moderate assistance    Upper Body Dressing: Setup;Supervision    Lower Body Dressing: Maximum assistance    Toileting: Moderate assistance                ADL Intervention and task modifications:         Grooming  Washing Hands: Set-up; Supervision (seated)         Lower Body Dressing Assistance  Socks: Total assistance (dependent)    Toileting  Bladder Hygiene: Supervision  Bowel Hygiene: Maximum assistance       Home safety: Patient instructed on home modifications and safety (raise height of ADL objects, appropriate height of chair surfaces, recliner safety, change of floor surfaces, clear pathways) to increase independence and fall prevention. Patient indicated understanding. Standing: Patient instructed and demonstrated to walk up to sink/counter top/surfaces, step into walker to increase safety of joint and fall prevention. Instructed to apply concept of hip contraindications to ADLs within the home (no extreme reaching across body to Right side, square off while using objects, slide objects along surfaces). Patient instructed to increase amount of time standing, observe standing position during ADLs in order to increase even weight bearing through bilateral LEs in order to increase independence with ADLs. Goal to be reached 30 days post - op, per orthopedic surgeon or per PT. Patient indicated understanding. Provided education through multi-modal cues for RW safety including proper positioning, hand placement,  and safety. Patient able to perform sit <> stand with min assistance. Fair understanding of RW use and safety. Provided education with patient on fall prevention for hospital and at home.   This includes not getting OOB/chair/toilet without staff assistance, good lighting, good footwear, and recommended AD use. Patient with good understanding. Functional Measure:    Barthel Index:  Bathin  Bladder: 10  Bowels: 5  Groomin  Dressin  Feeding: 10  Mobility: 0  Stairs: 0  Toilet Use: 5  Transfer (Bed to Chair and Back): 10  Total: 50/100      The Barthel ADL Index: Guidelines  1. The index should be used as a record of what a patient does, not as a record of what a patient could do. 2. The main aim is to establish degree of independence from any help, physical or verbal, however minor and for whatever reason. 3. The need for supervision renders the patient not independent. 4. A patient's performance should be established using the best available evidence. Asking the patient, friends/relatives and nurses are the usual sources, but direct observation and common sense are also important. However direct testing is not needed. 5. Usually the patient's performance over the preceding 24-48 hours is important, but occasionally longer periods will be relevant. 6. Middle categories imply that the patient supplies over 50 per cent of the effort. 7. Use of aids to be independent is allowed. Score Interpretation (from [address] [address])    Independent   60-79 Minimally independent   40-59 Partially dependent   20-39 Very dependent   <20 Totally dependent     -Antolin Marrero., Barthel, D.W. (1965). Functional evaluation: the Barthel Index. [address] [facility] ([address] (1997). The Barthel activities of daily living index: self-reporting versus actual performance in the old (> or = 75 years). Journal of [address] [place] [address] 45(7), 14 [facility], [facility] Memorial HospitalKatieJULIANN, Bianca Smith., Lilly Busby. (). Measuring the change in disability after inpatient rehabilitation; comparison of the responsiveness of the Barthel Index and Functional Orange Measure.  Journal of Neurology, Neurosurgery, and Psychiatry, 66(4), 173-898. LELE Henderson.A, LUCI Shin, & Matt Oneill M.A. (2004) Assessment of post-stroke quality of life in cost-effectiveness studies: The usefulness of the Barthel Index and the EuroQoL-5D. Quality of Life Research, 15, 041-24         Occupational Therapy Evaluation Charge Determination   History Examination Decision-Making   LOW Complexity : Brief history review  LOW Complexity : 1-3 performance deficits relating to physical, cognitive , or psychosocial skils that result in activity limitations and / or participation restrictions  LOW Complexity : No comorbidities that affect functional and no verbal or physical assistance needed to complete eval tasks       Based on the above components, the patient evaluation is determined to be of the following complexity level: LOW   Pain Rating:  L hip pain- at rest 0/10, with activity 6/10     Activity Tolerance:   Fair    After treatment patient left in no apparent distress:    Supine in bed and Call bell within reach    COMMUNICATION/EDUCATION:   The patients plan of care was discussed with: Physical therapist and Registered nurse. Home safety education was provided and the patient/caregiver indicated understanding. and Patient/family agree to work toward stated goals and plan of care. This patients plan of care is appropriate for delegation to [facility].     Thank you for this referral.  Stephanie Mcdaniel OT  Time Calculation: 28 mins

## 2022-03-18 NOTE — PROGRESS NOTES
Ortho NP Note    POD# 1  s/p LEFT TOTAL HIP ARTHROPLASTY ANTERIOR APPROACH   Pt seen in room     Pt resting in bed. Reports that she is comfortable at rest but pain is \"a 10/10 or more whenever I move. \"  Aware of plan for PT today and agreeable to work with them. Denies N/V. No CP/SOB. VSS Afebrile. Visit Vitals  /67 (BP 1 Location: Right upper arm, BP Patient Position: Lying)   Pulse 80   Temp 99.1 °F (37.3 °C)   Resp 16   Wt 105.7 kg (233 lb)   SpO2 95%   BMI 39.99 kg/m²     Voiding status: spontaneous void    Labs    Lab Results   Component Value Date/Time    HGB 10.1 (L) 03/18/2022 05:44 AM      No results found for: INR, INREXT   Lab Results   Component Value Date/Time    Sodium 134 (L) 03/18/2022 05:44 AM    Potassium 4.6 03/18/2022 05:44 AM    Chloride 104 03/18/2022 05:44 AM    CO2 25 03/18/2022 05:44 AM    Glucose 131 (H) 03/18/2022 05:44 AM    BUN 14 03/18/2022 05:44 AM    Creatinine 0.81 03/18/2022 05:44 AM    Calcium 8.5 03/18/2022 05:44 AM     Recent Glucose Results:   Lab Results   Component Value Date/Time     (H) 03/18/2022 05:44 AM    GLUCPOC 93 03/17/2022 12:05 PM           Body mass index is 39.99 kg/m². : A BMI > 30 is classified as obesity and > 40 is classified as morbid obesity. Aquacel dressing c.d.i. with small amount of expected bruising, swelling around incision. Cryotherapy in place over incision  Calves soft and supple; No pain with passive stretch  Bilateral LEs warm, dry. 2+ DP pulses. Sensation and motor intact - PF/DF/EHL intact 5/5  Foot Pumps for mechanical DVT proph while in bed     PLAN:  1) PT: BID WBAT  2) Anticoagulation:  Aspirin 81 mg PO BID for DVT Prophylaxis. Encouraged early mobilization, bed exercises, and SCD use. 3) GI Prophylaxis - Pepcid  4) Pain - Multimodal approach including cryotherapy, scheduled Celebrex starting today with  PRN oxycodone & IV dilaudid + Robaxin.     5) Post operative anemia: Hgb 13.3 on 2/23 [facility]). Today: 10.1. Mild swelling to incisional area but no active bleeding. Expected Acute blood loss post-op anemia. Recheck if still here tomorrow. 6) Hx HTN: BP this mornin/67, HR 80. Resume home metoprolol, lisinopril. 7) Readniess for discharge:     [x] Vital Signs stable    [x] Hgb stable    [x] + Voiding    [x] Wound intact, drainage minimal    [x] Tolerating PO intake     [] Cleared by PT (OT if applicable)     [] Stair training completed (if applicable)    [] Independent / Contact Guard Assist (household distance)     [] Bed mobility     [] Car transfers     [] ADLs    [] Adequate pain control on oral medication alone     Pain control concerns; PT evaluation today--dispo to home today vs tomorrow. Rx's except narcotics done.      Alexey Luong NP  Available via Perfect Serve

## 2022-03-18 NOTE — PROGRESS NOTES
Problem: Mobility Impaired (Adult and Pediatric)  Goal: *Acute Goals and Plan of Care (Insert Text)  Description: FUNCTIONAL STATUS PRIOR TO ADMISSION: Patient was modified independent using a single point cane for functional mobility. HOME SUPPORT PRIOR TO ADMISSION: The patient lived with house mate but did not require assist.    Physical Therapy Goals  Initiated 3/18/2022    1. Patient will move from supine to sit and sit to supine  in bed with modified independence within 4 days. 2. Patient will perform sit to stand with modified independence within 4 days. 3. Patient will ambulate with modified independence for 120 feet with the least restrictive device within 4 days. 4. Patient will ascend/descend 3 stairs with 2 handrail(s) with modified independence within 4 days. 5. Patient will verbalize and demonstrate understanding of anterior total hip precautions per protocol within 4 days. 6. Patient will perform LARA home exercise program per protocol with independence within 4 days. Outcome: Progressing Towards Goal   PHYSICAL THERAPY EVALUATION  Patient: Itz Ferro (6 y.o. female)  Date: 3/18/2022  Primary Diagnosis: Osteoarthritis of left hip [M16.12]  S/P total left hip arthroplasty [Z96.642]  Procedure(s) (LRB):  LEFT TOTAL HIP ARTHROPLASTY ANTERIOR APPROACH (Left) 1 Day Post-Op   Precautions:   WBAT,Fall    ASSESSMENT  Based on the objective data described below, the patient presents with decreased independence with functional mobility post-op day 1 L LARA. Patient has a hx of L rotator cuff tear with significantly limited ROM. She demonstrates the ability to transition supine to sit with Mod A and HOB elevated. Patient also requires significant assistance to scoot forward to achieve foot flat. Bed height is elevated and patient is able to perform sit to stand with Min A. She stand pivots  to the right to bedside commode.  Patient is then able to take several steps with heavy UE reliance and step to gait pattern. She is left in bedside chair with all needs met. Current Level of Function Impacting Discharge (mobility/balance): Mod- Max A bed mobility, Min A gait and transfers with Rw     Functional Outcome Measure: The patient scored 55/100 on the Barthel outcome measure. Other factors to consider for discharge: medical stability, decreased balance, increased need for assistance      Patient will benefit from skilled therapy intervention to address the above noted impairments. PLAN :  Recommendations and Planned Interventions: bed mobility training, transfer training, gait training, therapeutic exercises, neuromuscular re-education, edema management/control, patient and family training/education, and therapeutic activities      Frequency/Duration: Patient will be followed by physical therapy:  twice daily to address goals. Recommendation for discharge: (in order for the patient to meet his/her long term goals)  Physical therapy at least 2 days/week in the home     This discharge recommendation:  Has been made in collaboration with the attending provider and/or case management    IF patient discharges home will need the following DME: patient owns DME required for discharge         SUBJECTIVE:   Patient stated I wasn't expecting it to hurt this bad, the spinal didn't work.     OBJECTIVE DATA SUMMARY:   HISTORY:    Past Medical History:   Diagnosis Date    Anemia     HISTORY OF ANEMIA    Arthritis     BACK & HIPS    Cancer ([facility] [address] 2021    RIGHT UPPER CHEST    Heart attack ([facility] [address] 2016    Hypertension     Prediabetes     Rotator cuff injury     LEFT SIDE    Stroke ([facility] [address] 01/04/2006     Past Surgical History:   Procedure Laterality Date    HX COLONOSCOPY      HX CORONARY STENT PLACEMENT  2016    X3 STENTS    HX ENDOSCOPY      HX ORTHOPAEDIC Right 2015    HX ORTHOPAEDIC Left     FOOT    HX OTHER SURGICAL      HX WISDOM TEETH EXTRACTION      UT CARDIAC SURG PROCEDURE UNLIST      TITANIUM MUSHROOM PATCH IN HEART MCV/VCU (DUE TO A HOLE IN HEART)       Personal factors and/or comorbidities impacting plan of care:     Home Situation  Home Environment: Private residence  # Steps to Enter: 3  Rails to Enter: Yes  One/Two Story Residence: One story  Living Alone: No  Support Systems: Spouse/Significant Other  Patient Expects to be Discharged to[de-identified] Home with home health    EXAMINATION/PRESENTATION/DECISION MAKING:   Critical Behavior:  Neurologic State: Alert           Hearing:     Skin:    Edema:   Range Of Motion:  AROM: Grossly decreased, non-functional (L total rotator cuff tear)           PROM: Grossly decreased, non-functional (L shoulder)           Strength:    Strength: Generally decreased, functional                    Tone & Sensation:   Tone: Normal              Sensation: Intact               Coordination:  Coordination: Within functional limits  Vision:      Functional Mobility:  Bed Mobility:     Supine to Sit: Maximum assistance;Bed Modified; Additional time     Scooting: Moderate assistance  Transfers:  Sit to Stand: Minimum assistance  Stand to Sit: Minimum assistance        Bed to Chair: Minimum assistance              Balance:   Sitting: Intact; With support  Standing: Impaired; With support  Standing - Static: Constant support; Fair  Standing - Dynamic : Constant support; Fair  Ambulation/Gait Training:                       Left Side Weight Bearing: As tolerated                                 Stairs: Therapeutic Exercises:       Functional Measure:  Barthel Index:    Bathin  Bladder: 10  Bowels: 5  Groomin  Dressin  Feeding: 10  Mobility: 10  Stairs: 0  Toilet Use: 5  Transfer (Bed to Chair and Back): 5  Total: 55/100       The Barthel ADL Index: Guidelines  1. The index should be used as a record of what a patient does, not as a record of what a patient could do.   2. The main aim is to establish degree of independence from any help, physical or verbal, however minor and for whatever reason. 3. The need for supervision renders the patient not independent. 4. A patient's performance should be established using the best available evidence. Asking the patient, friends/relatives and nurses are the usual sources, but direct observation and common sense are also important. However direct testing is not needed. 5. Usually the patient's performance over the preceding 24-48 hours is important, but occasionally longer periods will be relevant. 6. Middle categories imply that the patient supplies over 50 per cent of the effort. 7. Use of aids to be independent is allowed. Score Interpretation (from [address] [facility])    Independent   60-79 Minimally independent   40-59 Partially dependent   20-39 Very dependent   <20 Totally dependent     -Antolin Marrero., Barthel, DKatieW. (1965). Functional evaluation: the Barthel Index. [address] [facility] ([address] (1997). The Barthel activities of daily living index: self-reporting versus actual performance in the old (> or = 75 years). Journal [address]), [address] [facility], [place], Roxann Harley., [facility]. (1999). Measuring the change in disability after inpatient rehabilitation; comparison of the responsiveness of the Barthel Index and Functional [place] Measure. Journal of Neurology, Neurosurgery, and Psychiatry, 66(4), 405-457. CITLALI Gregg, LUCI Shin, & Javon Carbone M.A. (2004) Assessment of post-stroke quality of life in cost-effectiveness studies: The usefulness of the Barthel Index and the EuroQoL-5D.  Quality of Life Research, 15, 639-47            Physical Therapy Evaluation Charge Determination   History Examination Presentation Decision-Making   MEDIUM  Complexity : 1-2 comorbidities / personal factors will impact the outcome/ POC  LOW Complexity : 1-2 Standardized tests and measures addressing body structure, function, activity limitation and / or participation in recreation  MEDIUM Complexity : Evolving with changing characteristics  Other outcome measures Barthel  MEDIUM      Based on the above components, the patient evaluation is determined to be of the following complexity level: MEDIUM    Pain Rating:      Activity Tolerance:   Fair    After treatment patient left in no apparent distress:   Sitting in chair and Call bell within reach    COMMUNICATION/EDUCATION:   The patients plan of care was discussed with: Occupational therapist and Registered nurse. Fall prevention education was provided and the patient/caregiver indicated understanding., Patient/family have participated as able in goal setting and plan of care. , and Patient/family agree to work toward stated goals and plan of care.     Thank you for this referral.  Berenice Moreland, PT   Time Calculation: 31 mins

## 2022-03-18 NOTE — PROGRESS NOTES
Progress Note    Status Post: Left total hip arthroplasty by Direct Anterior approach  Date of Surgery: 3/17/22  Surgeon: Dr. Seema Burnett    Subjective:     No acute events over night. Ashlyn Mike states that she is doing ok. She says she is in significant pain. She used a bedside commode but did not walk farther, largely limited by pain. Denies CP, SOB, nausea/vomitting, parasthesias.      Objective:     Visit Vitals  /69   Pulse 88   Temp 98.5 °F (36.9 °C)   Resp 16   Wt 105.7 kg (233 lb)   SpO2 94%   BMI 39.99 kg/m²       Patient Vitals for the past 24 hrs:   Temp Pulse Resp BP SpO2   03/18/22 0319 98.5 °F (36.9 °C) 88 16 121/69 94 %   03/17/22 2316 -- -- -- 131/87 --   03/17/22 2311 99.1 °F (37.3 °C) 97 18 111/69 96 %   03/17/22 2225 98.8 °F (37.1 °C) (!) 102 18 120/75 95 %   03/17/22 2027 98 °F (36.7 °C) 94 16 136/75 97 %   03/17/22 1916 98.1 °F (36.7 °C) 84 14 122/66 100 %   03/17/22 1830 -- 80 12 (!) 127/58 100 %   03/17/22 1815 -- 79 12 125/65 95 %   03/17/22 1800 -- 78 11 114/61 95 %   03/17/22 1755 -- 78 11 -- 96 %   03/17/22 1750 -- 75 12 -- 97 %   03/17/22 1745 -- 74 10 (!) 128/48 98 %   03/17/22 1735 -- 75 10 -- 96 %   03/17/22 1730 98.8 °F (37.1 °C) 75 11 (!) 114/58 92 %   03/17/22 1725 -- 75 8 -- 96 %   03/17/22 1720 -- 74 14 -- 100 %   03/17/22 1715 -- 74 9 123/61 99 %   03/17/22 1710 -- 74 11 (!) 117/97 98 %   03/17/22 1705 -- 75 13 119/60 96 %   03/17/22 1700 -- 76 16 132/67 94 %   03/17/22 1656 98.6 °F (37 °C) 74 12 130/61 96 %   03/17/22 1655 -- -- -- 130/61 --   03/17/22 1147 98.4 °F (36.9 °C) 88 18 138/64 95 %        Physical Exam:  Gen: NAD, AAOx3  Resp: No acute respiratory distress  MSK:  LLE  Dressing is clean, dry, and intact   Mild sweating within the groin fold  Motor intact distally  Sensation is intact to light touch distally   Mild swelling about the hip              Intake/Output Summary (Last 24 hours) at 3/18/2022 0834  Last data filed at 3/18/2022 0200  Gross per 24 hour   Intake 2040 ml   Output 450 ml   Net 1590 ml       LABS :  Recent Results (from the past 24 hour(s))   GLUCOSE, POC    Collection Time: 03/17/22 12:05 PM   Result Value Ref Range    Glucose (POC) 93 65 - 117 mg/dL    Performed by Divergence    METABOLIC PANEL, BASIC    Collection Time: 03/18/22  5:44 AM   Result Value Ref Range    Sodium 134 (L) 136 - 145 mmol/L    Potassium 4.6 3.5 - 5.1 mmol/L    Chloride 104 97 - 108 mmol/L    CO2 25 21 - 32 mmol/L    Anion gap 5 5 - 15 mmol/L    Glucose 131 (H) 65 - 100 mg/dL    BUN 14 6 - 20 MG/DL    Creatinine 0.81 0.55 - 1.02 MG/DL    BUN/Creatinine ratio 17 12 - 20      GFR est AA >60 >60 ml/min/1.73m2    GFR est non-AA >60 >60 ml/min/1.73m2    Calcium 8.5 8.5 - 10.1 MG/DL   HEMOGLOBIN    Collection Time: 03/18/22  5:44 AM   Result Value Ref Range    HGB 10.1 (L) 11.5 - 16.0 g/dL        Assessment:   Brandon Soto is a 73y.o. year-old female with Left hip osteoarthritis status post total hip replacement by Direct Anterior Approach POD #1    Plan:   -The patient required an overnight stay for pain control and to improve mobility with Physical Therapy.  -Weighbearing: WBAT LLE, device as needed  -Hip precautions: Anterior  -DVT prophylaxis: SCDs, frequent ambulation, ASA 81mg BID  -Antibiotics: for 24h  -Pain control: Continue as needed pain management, ice to operative area  -PT/OT for mobilization and gait training.   -Incentive Spirometry  -Dispo: Discharge planning to Home today if passes PT and pain controllable. Follow up with me in office in 10-14 days        Lizeth Jorgensen MD    03/18/22  8:34 AM        Lizeth Jorgensen M.D.   [address] [facility] Office  Cell: [phone]  Office: [phone]  Medical Staff: David Zimmerman MA

## 2022-03-19 LAB — HGB BLD-MCNC: 9.1 G/DL (ref 11.5–16)

## 2022-03-19 PROCEDURE — 85018 HEMOGLOBIN: CPT

## 2022-03-19 PROCEDURE — 97535 SELF CARE MNGMENT TRAINING: CPT

## 2022-03-19 PROCEDURE — 74011250637 HC RX REV CODE- 250/637

## 2022-03-19 PROCEDURE — 97530 THERAPEUTIC ACTIVITIES: CPT

## 2022-03-19 PROCEDURE — G0378 HOSPITAL OBSERVATION PER HR: HCPCS

## 2022-03-19 PROCEDURE — 74011000250 HC RX REV CODE- 250: Performed by: ORTHOPAEDIC SURGERY

## 2022-03-19 PROCEDURE — 74011250637 HC RX REV CODE- 250/637: Performed by: ORTHOPAEDIC SURGERY

## 2022-03-19 PROCEDURE — 97116 GAIT TRAINING THERAPY: CPT

## 2022-03-19 PROCEDURE — 36415 COLL VENOUS BLD VENIPUNCTURE: CPT

## 2022-03-19 RX ORDER — OXYCODONE HYDROCHLORIDE 5 MG/1
5 TABLET ORAL
Qty: 35 TABLET | Refills: 0 | Status: SHIPPED | OUTPATIENT
Start: 2022-03-19 | End: 2022-03-21

## 2022-03-19 RX ADMIN — OXYCODONE 10 MG: 5 TABLET ORAL at 08:59

## 2022-03-19 RX ADMIN — ASPIRIN 81 MG: 81 TABLET, COATED ORAL at 17:23

## 2022-03-19 RX ADMIN — OXYCODONE 5 MG: 5 TABLET ORAL at 03:16

## 2022-03-19 RX ADMIN — ACETAMINOPHEN 650 MG: 325 TABLET ORAL at 06:28

## 2022-03-19 RX ADMIN — FAMOTIDINE 20 MG: 20 TABLET ORAL at 17:23

## 2022-03-19 RX ADMIN — Medication 1.5 MG: at 22:16

## 2022-03-19 RX ADMIN — METOPROLOL TARTRATE 12.5 MG: 25 TABLET, FILM COATED ORAL at 17:23

## 2022-03-19 RX ADMIN — ACETAMINOPHEN 650 MG: 325 TABLET ORAL at 17:22

## 2022-03-19 RX ADMIN — ACETAMINOPHEN 650 MG: 325 TABLET ORAL at 12:00

## 2022-03-19 RX ADMIN — FAMOTIDINE 20 MG: 20 TABLET ORAL at 08:59

## 2022-03-19 RX ADMIN — POLYETHYLENE GLYCOL 3350 17 G: 17 POWDER, FOR SOLUTION ORAL at 09:00

## 2022-03-19 RX ADMIN — ACETAMINOPHEN 650 MG: 325 TABLET ORAL at 00:09

## 2022-03-19 RX ADMIN — OXYCODONE 5 MG: 5 TABLET ORAL at 17:23

## 2022-03-19 RX ADMIN — SODIUM CHLORIDE, PRESERVATIVE FREE 10 ML: 5 INJECTION INTRAVENOUS at 22:00

## 2022-03-19 RX ADMIN — SENNOSIDES AND DOCUSATE SODIUM 1 TABLET: 50; 8.6 TABLET ORAL at 17:23

## 2022-03-19 RX ADMIN — Medication 1 CAPSULE: at 08:59

## 2022-03-19 RX ADMIN — ATORVASTATIN CALCIUM 80 MG: 40 TABLET, FILM COATED ORAL at 22:17

## 2022-03-19 RX ADMIN — METOPROLOL TARTRATE 12.5 MG: 25 TABLET, FILM COATED ORAL at 09:01

## 2022-03-19 RX ADMIN — SENNOSIDES AND DOCUSATE SODIUM 1 TABLET: 50; 8.6 TABLET ORAL at 08:59

## 2022-03-19 RX ADMIN — CELECOXIB 200 MG: 200 CAPSULE ORAL at 08:59

## 2022-03-19 RX ADMIN — SODIUM CHLORIDE, PRESERVATIVE FREE 10 ML: 5 INJECTION INTRAVENOUS at 14:00

## 2022-03-19 RX ADMIN — SODIUM CHLORIDE, PRESERVATIVE FREE 10 ML: 5 INJECTION INTRAVENOUS at 06:00

## 2022-03-19 RX ADMIN — ACETAMINOPHEN 650 MG: 325 TABLET ORAL at 23:39

## 2022-03-19 RX ADMIN — ASPIRIN 81 MG: 81 TABLET, COATED ORAL at 08:59

## 2022-03-19 RX ADMIN — CELECOXIB 200 MG: 200 CAPSULE ORAL at 17:22

## 2022-03-19 RX ADMIN — OXYCODONE 5 MG: 5 TABLET ORAL at 22:17

## 2022-03-19 NOTE — PROGRESS NOTES
Perfect Serve to Dr. Hazel Loza regarding BP of 93/47. Per Dr. Hazel Loza, Normal Saline 125 ml/hr,  promote fluid intake and encourage patient to mobilize more.

## 2022-03-19 NOTE — PROGRESS NOTES
Bedside and Verbal shift change report given to Mando Burton RN (oncoming nurse) by Olivia Perez RN (offgoing nurse). Report included the following information SBAR and MAR.

## 2022-03-19 NOTE — PROGRESS NOTES
Problem: Patient Education: Go to Patient Education Activity  Goal: Patient/Family Education  Outcome: Progressing Towards Goal     Problem: Patient Education: Go to Patient Education Activity  Goal: Patient/Family Education  Outcome: Progressing Towards Goal     Problem: Falls - Risk of  Goal: *Absence of Falls  Description: Document Mike Barnett Fall Risk and appropriate interventions in the flowsheet.   Outcome: Progressing Towards Goal  Note: Fall Risk Interventions:  Mobility Interventions: Communicate number of staff needed for ambulation/transfer,Patient to call before getting OOB         Medication Interventions: Assess postural VS orthostatic hypotension,Evaluate medications/consider consulting pharmacy    Elimination Interventions: Call light in reach,Patient to call for help with toileting needs              Problem: Patient Education: Go to Patient Education Activity  Goal: Patient/Family Education  Outcome: Progressing Towards Goal

## 2022-03-19 NOTE — PROGRESS NOTES
Problem: Mobility Impaired (Adult and Pediatric)  Goal: *Acute Goals and Plan of Care (Insert Text)  Description: FUNCTIONAL STATUS PRIOR TO ADMISSION: Patient was modified independent using a single point cane for functional mobility. HOME SUPPORT PRIOR TO ADMISSION: The patient lived with house mate but did not require assist.    Physical Therapy Goals  Initiated 3/18/2022    1. Patient will move from supine to sit and sit to supine  in bed with modified independence within 4 days. 2. Patient will perform sit to stand with modified independence within 4 days. 3. Patient will ambulate with modified independence for 120 feet with the least restrictive device within 4 days. 4. Patient will ascend/descend 3 stairs with 2 handrail(s) with modified independence within 4 days. 5. Patient will verbalize and demonstrate understanding of anterior total hip precautions per protocol within 4 days. 6. Patient will perform LARA home exercise program per protocol with independence within 4 days. Outcome: Progressing Towards Goal   PHYSICAL THERAPY TREATMENT  Patient: Wesley Kan (23 y.o. female)  Date: 3/19/2022  Diagnosis: Osteoarthritis of left hip [M16.12]  S/P total left hip arthroplasty [Z96.642] <principal problem not specified>  Procedure(s) (LRB):  LEFT TOTAL HIP ARTHROPLASTY ANTERIOR APPROACH (Left) 2 Days Post-Op  Precautions: WBAT,Fall  Chart, physical therapy assessment, plan of care and goals were reviewed. ASSESSMENT  Patient continues with skilled PT services and is progressing towards goals. She reports she has not called for pain medicine this afternoon and gentle reminder is provided to call at scheduled time. BP is assessed to be low in sitting and patient demonstrates a MAP of 60 once in standing. She denies symptoms and requests to use the bedside commode. She ambulates with step to gait pattern 5ft. BP is stable after short distance ambulation. Patient is able to sit on [facility] and void. She ambulates back to bed and is provided Mod A for sit to supine. Patient demonstrates heavy reliance on bed rails. Current Level of Function Impacting Discharge (mobility/balance): CGA with RW    Other factors to consider for discharge: medical stability, pain management, increased gait and stair training          PLAN :  Patient continues to benefit from skilled intervention to address the above impairments. Continue treatment per established plan of care. to address goals. Recommendation for discharge: (in order for the patient to meet his/her long term goals)  Physical therapy at least 2 days/week in the home     This discharge recommendation:  Has been made in collaboration with the attending provider and/or case management    IF patient discharges home will need the following DME: patient owns DME required for discharge       SUBJECTIVE:   Patient stated I think I would rather wait until I have had pain medicine to walk farther.     OBJECTIVE DATA SUMMARY:   Critical Behavior:  Neurologic State: Alert           Functional Mobility Training:  Bed Mobility:     Supine to Sit: Moderate assistance  Sit to Supine: Moderate assistance;Bed Modified  Scooting: Stand-by assistance        Transfers:  Sit to Stand: Contact guard assistance  Stand to Sit: Contact guard assistance        Bed to Chair: Contact guard assistance                    Balance:  Sitting: Intact; Without support  Standing: Impaired; With support  Standing - Static: Good;Constant support  Standing - Dynamic : Fair;Constant support  Ambulation/Gait Training:  Distance (ft): 25 Feet (ft)  Assistive Device: Gait belt;Walker, rolling  Ambulation - Level of Assistance: Contact guard assistance        Gait Abnormalities: Decreased step clearance        Base of Support: Widened  Stance: Left decreased  Speed/Nora: Slow  Step Length: Right shortened;Left shortened                    Stairs:               Therapeutic Exercises:     Pain Rating:      Activity Tolerance:   Fair    After treatment patient left in no apparent distress:   Supine in bed, Call bell within reach, and Side rails x 3    COMMUNICATION/COLLABORATION:   The patients plan of care was discussed with: Registered nurse.      Madelaine Araujo, PT   Time Calculation: 26 mins

## 2022-03-19 NOTE — PROGRESS NOTES
Problem: Self Care Deficits Care Plan (Adult)  Goal: *Acute Goals and Plan of Care (Insert Text)  Description: FUNCTIONAL STATUS PRIOR TO ADMISSION: Patient was modified independent using a SPC for functional mobility. HOME SUPPORT: The patient lived with roommate but did not require assist.    Occupational Therapy Goals  Initiated 3/18/2022  1. Patient will perform lower body ADLs with AE supervision/set-up within 4 day(s). 2.  Patient will perform upper body ADLs standing 5 mins without fatigue or LOB with supervision/set-up within 4 day(s). 3.  Patient will perform toilet transfer with supervision/set-up within 4 day(s). 4.  Patient will perform all aspects of toileting with supervision/set-up within 4 day(s). 5.  Patient will participate in upper extremity therapeutic exercise/activities with supervision/set-up for 10 minutes within 4 day(s). 6.  Patient will utilize energy conservation techniques during functional activities without cues within 4 day(s). Outcome: Progressing Towards Goal   OCCUPATIONAL THERAPY TREATMENT  Patient: Mayra Nguyen (22 y.o. female)  Date: 3/19/2022  Diagnosis: Osteoarthritis of left hip [M16.12]  S/P total left hip arthroplasty [Z96.642] <principal problem not specified>  Procedure(s) (LRB):  LEFT TOTAL HIP ARTHROPLASTY ANTERIOR APPROACH (Left) 2 Days Post-Op  Precautions: WBAT,Fall  Chart, occupational therapy assessment, plan of care, and goals were reviewed. ASSESSMENT  Patient continues with skilled OT services and is progressing towards goals. Patient semi supine in bed upon OT arrival and agreeable to working with therapy, requesting to use bathroom. Patient presents with impaired activity tolerance, decreased standing balance, pain in left hip with mobility, decreased generalized strength, and increased time required for all tasks. Patient transferring to edge of bed with increased time and cueing for task.  Patient initially agreeable to attempt ambulation into bathroom but then requesting BSC due to urgency. Patient transferred to [facility] and requires assist for bowel care due to limited reach and body habitus, patient reports difficulty completing at baseline. Patient then walking from commode to chair ~5 feet and once sitting up in chair reviewed items and how to use in hip kits. Patient demonstrated doffing socks with dressing stick and donning sock with sock aide, verbalized understanding of remained of adaptive equipment. Overall patient with improvements compared to day prior but remains limited by pain and activity tolerance. Patient would benefit from skilled OT services during admission to improve independence with self care and functional mobility/transfers. Recommend discharge to home with [facility] and family care at this time. Current Level of Function Impacting Discharge (ADLs): mod A bed mobility, CGA out of bed mobility/transfers, setup assist lower extremity activities of daily living using adaptive equipment, and setup upper extremity activities of daily living     Other factors to consider for discharge: POD #2 s/p LARA, good family support, prior level of function          PLAN :  Patient continues to benefit from skilled intervention to address the above impairments. Continue treatment per established plan of care to address goals.     Recommend with staff: up to chair 3x/day for meals, BSC vs functional mobility to and from bathroom for toileting dependent on urgency    Recommend next OT session: functional mobility to and from bathroom    Recommendation for discharge: (in order for the patient to meet his/her long term goals)  Occupational therapy at least 2 days/week in the home AND ensure assist and/or supervision for safety with higher level ADLs/IADLs    This discharge recommendation:  Has been made in collaboration with the attending provider and/or case management    IF patient discharges home will need the following DME: bedside commode and shower chair       SUBJECTIVE:   Patient stated I try to get up only when I really need to go because it's hard to move.  Patient encouraged to increase out of bed activity every day for improved mobility. OBJECTIVE DATA SUMMARY:   Cognitive/Behavioral Status:  Neurologic State: Alert                   Functional Mobility and Transfers for ADLs:  Bed Mobility:  Supine to Sit: Moderate assistance  Sit to Supine: Other (comment) (pt left sitting in chair)  Scooting: Moderate assistance    Transfers:  Sit to Stand: Contact guard assistance  Functional Transfers  Toilet Transfer : Contact guard assistance (BSC)  Bed to Chair: Contact guard assistance    Balance:  Sitting: Intact; Without support  Standing: Impaired; With support  Standing - Static: Good;Constant support  Standing - Dynamic : Fair;Constant support    ADL Intervention:  Feeding  Container Management: Independent  Food to Mouth: Independent  Drink to Mouth: Independent    Grooming  Position Performed: Seated in chair  Washing Hands: Set-up                   Lower Body Dressing Assistance  Socks: Set-up  Leg Crossed Method Used: No  Position Performed: Seated in chair  Cues: Verbal cues provided  Adaptive Equipment Used: Sock aid    Toileting  Bladder Hygiene: Set-up  Bowel Hygiene: Maximum assistance (difficulty reaching at baseline)  Clothing Management: Contact guard assistance  Cues: Verbal cues provided      Dressing joint: Patient instructed and demonstrated to don/doff Left LE first/last verbal cues. Patient instructed and demonstrated to don all clothing while sitting prior to standing, doff all clothing to knees while standing, then sit to doff clothing off from knees to feet in order to facilitate fall prevention, pain management, and energy conservation with Setup.     Home safety: Patient instructed on home modifications and safety (raise height of ADL objects, appropriate height of chair surfaces, recliner safety, change of floor surfaces, clear pathways) to increase independence and fall prevention. Patient indicated understanding. Pain:  Minimal pain at rest, significant pain in left hip with bed mobility, did not rate    Activity Tolerance:   Fair, SpO2 stable on RA and requires rest breaks    After treatment patient left in no apparent distress:   Sitting in chair and Call bell within reach    COMMUNICATION/COLLABORATION:   The patients plan of care was discussed with: Registered nurse and Certified nursing assistant/patient care technician.      Ramon Douglas OTR/L  Time Calculation: 29 mins

## 2022-03-19 NOTE — PROGRESS NOTES
Problem: Patient Education: Go to Patient Education Activity  Goal: Patient/Family Education  3/19/2022 1958 by Una Gannon RN  Outcome: Progressing Towards Goal  3/19/2022 1741 by Una Gannon RN  Outcome: Progressing Towards Goal     Problem: Patient Education: Go to Patient Education Activity  Goal: Patient/Family Education  3/19/2022 1958 by Una Gannon RN  Outcome: Progressing Towards Goal  3/19/2022 1741 by Una Gannon RN  Outcome: Progressing Towards Goal     Problem: Falls - Risk of  Goal: *Absence of Falls  Description: Document Fiona De La Cruz Fall Risk and appropriate interventions in the flowsheet.   3/19/2022 1958 by Una Gannon RN  Outcome: Progressing Towards Goal  Note: Fall Risk Interventions:  Mobility Interventions: Communicate number of staff needed for ambulation/transfer,Patient to call before getting OOB         Medication Interventions: Assess postural VS orthostatic hypotension,Evaluate medications/consider consulting pharmacy    Elimination Interventions: Call light in reach,Patient to call for help with toileting needs           3/19/2022 1741 by Una Gannon RN  Outcome: Progressing Towards Goal  Note: Fall Risk Interventions:  Mobility Interventions: Communicate number of staff needed for ambulation/transfer,Patient to call before getting OOB         Medication Interventions: Assess postural VS orthostatic hypotension,Evaluate medications/consider consulting pharmacy    Elimination Interventions: Call light in reach,Patient to call for help with toileting needs              Problem: Patient Education: Go to Patient Education Activity  Goal: Patient/Family Education  3/19/2022 1958 by Una Gannon RN  Outcome: Progressing Towards Goal  3/19/2022 1741 by Una Gannon RN  Outcome: Progressing Towards Goal

## 2022-03-19 NOTE — PROGRESS NOTES
Transition of Care Plan  RUR N/A    Patient discharging today. Cm sent message via [facility] informing of the discharge and placed name and number of agency on discharge instructions. Friend to transport home      4 pm  Note  Patient  not discharging due to blood pressure concerns.

## 2022-03-19 NOTE — PROGRESS NOTES
Progress Note    Status Post: Left total hip arthroplasty by Direct Anterior approach  Date of Surgery: 3/17/22  Surgeon: Dr. Audrey Coello       Subjective:     No acute events over night. Terese Mcdaniel states that she is doing ok. She was able to walk to the bedside comode several times overnight without difficulty. Has not walked down the richards or done stairs. Pain has been controllable and improving    Denies CP, SOB, nausea/vomitting, parasthesias.      Objective:     Visit Vitals  /66 (BP 1 Location: Right upper arm, BP Patient Position: At rest)   Pulse 81   Temp 98.7 °F (37.1 °C)   Resp 16   Wt 105.7 kg (233 lb)   SpO2 98%   BMI 39.99 kg/m²       Patient Vitals for the past 24 hrs:   Temp Pulse Resp BP SpO2   03/19/22 0858 98.7 °F (37.1 °C) 81 16 111/66 98 %   03/19/22 0628 -- 81 -- (!) 101/51 --   03/19/22 0310 97.9 °F (36.6 °C) 100 17 115/62 94 %   03/19/22 0009 98.2 °F (36.8 °C) 88 18 (!) 92/48 93 %   03/18/22 2129 99.5 °F (37.5 °C) 86 17 (!) 97/44 94 %   03/18/22 1906 -- 88 -- (!) 104/57 --   03/18/22 1858 -- 91 -- (!) 95/57 --   03/18/22 1644 99.2 °F (37.3 °C) -- 16 (!) 119/59 96 %   03/18/22 1530 -- 99 -- (!) 104/50 --   03/18/22 1419 98.8 °F (37.1 °C) 99 16 (!) 92/54 91 %        Physical Exam:  Gen: NAD, AAOx3  Resp: No acute respiratory distress  MSK:  LLE  Dressing is clean, dry, and intact   Motor intact distally  Sensation is intact to light touch distally   Moderate swelling about the hip as expected  Mild ecchymosis around the hip              Intake/Output Summary (Last 24 hours) at 3/19/2022 1116  Last data filed at 3/19/2022 0858  Gross per 24 hour   Intake 840 ml   Output --   Net 840 ml       LABS :  Recent Results (from the past 24 hour(s))   HEMOGLOBIN    Collection Time: 03/19/22  3:08 AM   Result Value Ref Range    HGB 9.1 (L) 11.5 - 16.0 g/dL        Assessment:   Terese Mcdaniel is a 73y.o. year-old female with Left hip osteoarthritis status post total hip replacement by Direct Anterior Approach POD #2    Plan:   -The patient required an overnight stay for pain control and to improve mobility with Physical Therapy.  -Weighbearing: WBAT LLE, device as needed  -Hip precautions: Anterior  -DVT prophylaxis: SCDs, frequent ambulation, ASA 81mg BID  -Antibiotics: completed  -Pain control: Continue as needed pain management, ice to operative area  -PT/OT for mobilization and gait training.   -Incentive Spirometry  -Dispo: Discharge planning to Home today  Follow up with me in office in 10-14 days        Angelika Myrick MD    03/19/22  11:16 AM        Angelika Myrick M.D.   [address] [facility] Office  Cell: [phone]  Office: [phone]  Medical Staff: Jina Gee MA

## 2022-03-19 NOTE — DISCHARGE SUMMARY
Ortho Discharge Summary    Patient ID:  Catarina Rees  [id number]  female  73 y.o.  1955    Admit date: 3/17/2022    Discharge date: 3/21/2022    Admitting Physician: Gretta Light MD     Consulting Physician(s):   Treatment Team: Attending Provider: Shaka Oropeza MD; Utilization Review: Jayro Barry RN; Care Manager: Yasmin Brito RN; Care Manager: Erminio Kawasaki; Staff Nurse: Dilshad Esteban RN    Date of Surgery:   3/17/2022     Preoperative Diagnosis:  LEFT HIP OSTEOARTHRITIS    Postoperative Diagnosis:   LEFT HIP OSTEOARTHRITIS    Procedure(s):   LEFT TOTAL HIP ARTHROPLASTY ANTERIOR APPROACH     Anesthesia Type:   General     Surgeon: Shaka Oropeza MD                            HPI:  Pt is a 73 y.o. female who has a history of LEFT HIP OSTEOARTHRITIS  with pain and limitations of activities of daily living who presents at this time for a left LEFT TOTAL HIP ARTHROPLASTY ANTERIOR APPROACH following the failure of conservative management. PMH:   Past Medical History:   Diagnosis Date    Anemia     HISTORY OF ANEMIA    Arthritis     BACK & HIPS    Cancer ([facility] [address] 2021    RIGHT UPPER CHEST    Heart attack ([facility] [address] 2016    Hypertension     Prediabetes     Rotator cuff injury     LEFT SIDE    Stroke ([facility] [address] 01/04/2006       Body mass index is 39.99 kg/m². : A BMI > 30 is classified as obesity and > 40 is classified as morbid obesity. Medications upon admission :   Prior to Admission Medications   Prescriptions Last Dose Informant Patient Reported? Taking? L. rhamnosus GG/inulin ([address] [place] [address]) 3/16/2022 at 1600  Yes Yes   Sig: Take 1 Tablet by mouth nightly. MELATONIN PO 3/15/2022  Yes No   Sig: Take 1 Tablet by mouth nightly. acetaminophen/diphenhydramine (TYLENOL PM PO) 3/16/2022 at 1600  Yes Yes   Sig: Take 2 Capsules by mouth nightly. amoxicillin (AMOXIL) 875 mg tablet 3/10/2022 at Unknown time  Yes Yes   Sig: Take 875 mg by mouth two (2) times a day. aspirin delayed-release 81 mg tablet 3/15/2022  Yes Yes   Sig: Take 162 mg by mouth nightly. atorvastatin (LIPITOR) 80 mg tablet 3/16/2022 at 1600  Yes Yes   Sig: Take 80 mg by mouth nightly. cholecalciferol, vitamin D3, (VITAMIN D3 PO) 3/15/2022  Yes No   Sig: Take 1 Capsule by mouth two (2) times a day. 250 MCG   lisinopriL (PRINIVIL, ZESTRIL) 10 mg tablet 3/16/2022 at 1600  Yes Yes   Sig: Take 10 mg by mouth every morning. metoprolol tartrate (LOPRESSOR) 25 mg tablet 3/16/2022 at 1600  Yes Yes   Sig: Take 12.5 mg by mouth two (2) times a day. Facility-Administered Medications: None        Allergies:  No Known Allergies     Hospital Course: The patient underwent surgery. Complications:  None; patient tolerated the procedure well. Was taken to the PACU in stable condition and then transferred to the ortho floor. Perioperative Antibiotics:  Ancef     Postoperative Pain Management:  Oxycodone & Tylenol, Robaxin and Celebrex    DVT Prophylaxis: Aspirin 81 BID    Postoperative transfusions:    Number of units banked PRBCs =   none     Post Op complications: none    Hemoglobin at discharge:    Lab Results   Component Value Date/Time    HGB 9.1 (L) 03/19/2022 03:08 AM        No significant erythema or swelling. Wound appears to be healing without any evidence of infection. Neurovascular exam found to be within normal limits. Physical Therapy started following surgery and participated in bed mobility, transfers and ambulation.         Gait:  Gait  Base of Support: Widened,Shift to right  Speed/Nora: Slow,Pace decreased (<100 feet/min),Shuffled  Step Length: Left lengthened,Right shortened  Swing Pattern: Left asymmetrical  Stance: Left decreased,Right increased  Gait Abnormalities: Antalgic,Step to gait  Ambulation - Level of Assistance: Supervision  Distance (ft): 75 Feet (ft)  Assistive Device: Walker, rolling  Rail Use: Left   Stairs - Level of Assistance: Contact guard assistance,Assist X1  Number of Stairs Trained: 4                   Discharged to: Home. Condition on Discharge:   good    Discharge instructions:  - Anticoagulate with Aspirin 81 BID  - Take pain medications as prescribed  - Resume pre hospital diet      - Discharge activity: activity as tolerated  - Ambulate with assistive device as needed. - Weight bearing status WBAT LLE  - Wound Care Keep wound clean and dry. See discharge instruction sheet. -DISCHARGE MEDICATION LIST     Current Discharge Medication List      START taking these medications    Details   HYDROcodone-acetaminophen (NORCO)  mg tablet Take 1 Tablet by mouth every four (4) hours as needed for Pain for up to 7 days. Max Daily Amount: 6 Tablets. Qty: 30 Tablet, Refills: 0  Start date: 3/20/2022, End date: 3/27/2022    Associated Diagnoses: S/P total left hip arthroplasty; Primary osteoarthritis of left hip      celecoxib (CELEBREX) 200 mg capsule Take 1 Capsule by mouth two (2) times a day for 90 days. Qty: 60 Capsule, Refills: 2  Start date: 3/18/2022, End date: 6/16/2022      famotidine (PEPCID) 20 mg tablet Take 1 Tablet by mouth two (2) times a day for 30 days. Qty: 60 Tablet, Refills: 0  Start date: 3/18/2022, End date: 4/17/2022      methocarbamoL (ROBAXIN) 500 mg tablet Take 1 Tablet by mouth four (4) times daily as needed for Muscle Spasm(s) or Pain. Qty: 30 Tablet, Refills: 0  Start date: 3/18/2022      polyethylene glycol (MIRALAX) 17 gram packet Take 1 Packet by mouth daily for 14 days. Qty: 14 Packet, Refills: 0  Start date: 3/19/2022, End date: 4/2/2022      senna-docusate (PERICOLACE) 8.6-50 mg per tablet Take 1 Tablet by mouth two (2) times a day for 14 days. Qty: 28 Tablet, Refills: 0  Start date: 3/18/2022, End date: 4/1/2022         CONTINUE these medications which have CHANGED    Details   aspirin delayed-release 81 mg tablet Take 1 Tablet by mouth two (2) times a day for 30 days.   Qty: 60 Tablet, Refills: 0  Start date: 3/18/2022, End date: 4/17/2022         CONTINUE these medications which have NOT CHANGED    Details   atorvastatin (LIPITOR) 80 mg tablet Take 80 mg by mouth nightly. amoxicillin (AMOXIL) 875 mg tablet Take 875 mg by mouth two (2) times a day. metoprolol tartrate (LOPRESSOR) 25 mg tablet Take 12.5 mg by mouth two (2) times a day. acetaminophen/diphenhydramine (TYLENOL PM PO) Take 2 Capsules by mouth nightly. lisinopriL (PRINIVIL, ZESTRIL) 10 mg tablet Take 10 mg by mouth every morning. L. rhamnosus GG/inulin (C[employer]URE[employer] PO) Take 1 Tablet by mouth nightly. cholecalciferol, vitamin D3, (VITAMIN D3 PO) Take 1 Capsule by mouth two (2) times a day. 250 MCG      MELATONIN PO Take 1 Tablet by mouth nightly.           per medical continuation form      -Follow up in office in 2 weeks      Signed:  Joanne Heller MD  Orthopaedic Nurse Practitioner    3/21/2022  11:48 AM

## 2022-03-19 NOTE — PROGRESS NOTES
Patient up to bedside commode at least 3 times overnight with standby assistance and walker. Tolerated well.

## 2022-03-20 PROCEDURE — 74011000250 HC RX REV CODE- 250: Performed by: ORTHOPAEDIC SURGERY

## 2022-03-20 PROCEDURE — 74011250637 HC RX REV CODE- 250/637: Performed by: ORTHOPAEDIC SURGERY

## 2022-03-20 PROCEDURE — 74011250637 HC RX REV CODE- 250/637

## 2022-03-20 PROCEDURE — 97530 THERAPEUTIC ACTIVITIES: CPT

## 2022-03-20 PROCEDURE — 97116 GAIT TRAINING THERAPY: CPT

## 2022-03-20 PROCEDURE — G0378 HOSPITAL OBSERVATION PER HR: HCPCS

## 2022-03-20 RX ORDER — HYDROCODONE BITARTRATE AND ACETAMINOPHEN 10; 325 MG/1; MG/1
1 TABLET ORAL
Qty: 30 TABLET | Refills: 0 | Status: SHIPPED | OUTPATIENT
Start: 2022-03-20 | End: 2022-03-27

## 2022-03-20 RX ORDER — HYDROCODONE BITARTRATE AND ACETAMINOPHEN 10; 325 MG/1; MG/1
1 TABLET ORAL
Status: DISCONTINUED | OUTPATIENT
Start: 2022-03-20 | End: 2022-03-21 | Stop reason: HOSPADM

## 2022-03-20 RX ADMIN — SODIUM CHLORIDE, PRESERVATIVE FREE 10 ML: 5 INJECTION INTRAVENOUS at 22:23

## 2022-03-20 RX ADMIN — ACETAMINOPHEN 650 MG: 325 TABLET ORAL at 06:09

## 2022-03-20 RX ADMIN — Medication 1 CAPSULE: at 09:28

## 2022-03-20 RX ADMIN — ATORVASTATIN CALCIUM 80 MG: 40 TABLET, FILM COATED ORAL at 22:22

## 2022-03-20 RX ADMIN — Medication 1.5 MG: at 22:22

## 2022-03-20 RX ADMIN — ASPIRIN 81 MG: 81 TABLET, COATED ORAL at 18:54

## 2022-03-20 RX ADMIN — HYDROCODONE BITARTRATE AND ACETAMINOPHEN 1 TABLET: 10; 325 TABLET ORAL at 13:46

## 2022-03-20 RX ADMIN — CELECOXIB 200 MG: 200 CAPSULE ORAL at 09:28

## 2022-03-20 RX ADMIN — SENNOSIDES AND DOCUSATE SODIUM 1 TABLET: 50; 8.6 TABLET ORAL at 09:28

## 2022-03-20 RX ADMIN — FAMOTIDINE 20 MG: 20 TABLET ORAL at 09:28

## 2022-03-20 RX ADMIN — SENNOSIDES AND DOCUSATE SODIUM 1 TABLET: 50; 8.6 TABLET ORAL at 18:53

## 2022-03-20 RX ADMIN — HYDROCODONE BITARTRATE AND ACETAMINOPHEN 1 TABLET: 10; 325 TABLET ORAL at 23:05

## 2022-03-20 RX ADMIN — POLYETHYLENE GLYCOL 3350 17 G: 17 POWDER, FOR SOLUTION ORAL at 09:27

## 2022-03-20 RX ADMIN — ASPIRIN 81 MG: 81 TABLET, COATED ORAL at 09:28

## 2022-03-20 RX ADMIN — FAMOTIDINE 20 MG: 20 TABLET ORAL at 18:53

## 2022-03-20 RX ADMIN — SODIUM CHLORIDE, PRESERVATIVE FREE 10 ML: 5 INJECTION INTRAVENOUS at 06:00

## 2022-03-20 RX ADMIN — HYDROCODONE BITARTRATE AND ACETAMINOPHEN 1 TABLET: 10; 325 TABLET ORAL at 18:53

## 2022-03-20 RX ADMIN — METOPROLOL TARTRATE 12.5 MG: 25 TABLET, FILM COATED ORAL at 09:28

## 2022-03-20 RX ADMIN — OXYCODONE 5 MG: 5 TABLET ORAL at 09:28

## 2022-03-20 RX ADMIN — SODIUM CHLORIDE, PRESERVATIVE FREE 10 ML: 5 INJECTION INTRAVENOUS at 13:46

## 2022-03-20 RX ADMIN — METOPROLOL TARTRATE 12.5 MG: 25 TABLET, FILM COATED ORAL at 18:53

## 2022-03-20 RX ADMIN — ACETAMINOPHEN 650 MG: 325 TABLET ORAL at 12:18

## 2022-03-20 RX ADMIN — CELECOXIB 200 MG: 200 CAPSULE ORAL at 18:53
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
## 2022-03-20 NOTE — PROGRESS NOTES
Bedside and Verbal shift change report given to [address] [place] [address]   (oncoming nurse) by Emilia Perez RN  (offgoing nurse). Report included the following information SBAR, Kardex and Med Rec Status   Patient alert and responsive patient notes no distress will continue to assess pt .      3413  Patient alert and responsive pt found resting in bed pt tolerates bedside commode, patient refused to go to bathroom with staff as request by MD patient states she is not going to make it to the bathroom she does not want to go to the bathroom, she only wants to use bedside commode.  Patient notes no pain at the time will continue to assess

## 2022-03-20 NOTE — PROGRESS NOTES
Bedside and Verbal shift change report given to Mayra Mathis RN (oncoming nurse) by Awa Jaimes RN (offgoing nurse). Report included the following information SBAR and MAR.

## 2022-03-20 NOTE — PROGRESS NOTES
Progress Note    Status Post: Left total hip arthroplasty by Direct Anterior approach  Date of Surgery: 3/17/22  Surgeon: Dr. Daily Console    Subjective:     No acute events over night. Virgil Wyman states that she is doing ok. She is in substantial pain, but she notes that she is only getting oxycodone 5 mg due to medications being held due to pressure issues        Denies CP, SOB, nausea/vomitting, parasthesias. Objective:     Visit Vitals  BP (!) 96/38 (BP 1 Location: Left upper arm, BP Patient Position: Supine)   Pulse 76   Temp 98.1 °F (36.7 °C)   Resp 18   Wt 105.7 kg (233 lb)   SpO2 99%   BMI 39.99 kg/m²       Patient Vitals for the past 24 hrs:   Temp Pulse Resp BP SpO2   03/20/22 1100 -- -- -- (!) 96/38 99 %   03/20/22 0923 98.1 °F (36.7 °C) 76 18 (!) 103/54 99 %   03/20/22 0246 98.2 °F (36.8 °C) 70 18 125/72 97 %   03/19/22 2115 98.7 °F (37.1 °C) 68 18 (!) 125/57 94 %   03/19/22 1719 -- 71 -- (!) 100/49 --   03/19/22 1611 -- -- -- (!) 93/47 --   03/19/22 1539 98 °F (36.7 °C) 70 16 (!) 103/44 92 %        Physical Exam:  Gen: NAD, AAOx3  Resp: No acute respiratory distress  MSK:  LLE  Dressing is clean, dry, and intact   Motor intact distally  Sensation is intact to light touch distally   Moderate swelling about the hip as expected  Mild ecchymosis around the hip    I watched her walk down the hallway using a walker she appeared well supported with fatigue after a distance of about 25 feet. She then went to the therapy location to do stairs which she did very well.     Gait  Base of Support: Widened  Speed/Nora: Slow  Step Length: Right shortened,Left shortened  Stance: Left decreased  Gait Abnormalities: Decreased step clearance  Ambulation - Level of Assistance: Contact guard assistance  Distance (ft): 25 Feet (ft)  Assistive Device: Gait belt,Walker, rolling         Intake/Output Summary (Last 24 hours) at 3/20/2022 1250  Last data filed at 3/20/2022 0923  Gross per 24 hour   Intake 440 ml Output 1950 ml   Net -1510 ml       LABS :  No results found for this or any previous visit (from the past 24 hour(s)). Assessment:   Jordan Larson is a 73y.o. year-old female with Left hip osteoarthritis status post total hip replacement by Direct Anterior Approach POD #3    Plan:   -The patient required an overnight stay for pain control and to improve mobility with Physical Therapy.  -Weighbearing: WBAT LLE, device as needed  -Hip precautions: Anterior  -DVT prophylaxis: SCDs, frequent ambulation, ASA 81mg BID  -Antibiotics: completed  -Pain control: Continue as needed pain management, ice to operative area. I adjusted the pain medication, we will try hydrocodone 10 mg to see if this improves her pain control. -PT/OT for mobilization and gait training.   -Incentive Spirometry  -Dispo: Discharge planning to Home today if her ride is available. She describes that she has a complex social situation with a roommate who can only help her before 7 AM and after 4 PM.  She states that she does not know what he is doing today and likely cannot pick her up today. Follow up with me in office in 10-14 days        Geovanny Cha MD    03/20/22  12:38 PM        Geovanny Cha M.D.   [address] [facility] Office  Cell: [phone]  Office: [phone]  Medical Staff: Suzan Rai MA

## 2022-03-20 NOTE — PROGRESS NOTES
Problem: Mobility Impaired (Adult and Pediatric)  Goal: *Acute Goals and Plan of Care (Insert Text)  Description: FUNCTIONAL STATUS PRIOR TO ADMISSION: Patient was modified independent using a single point cane for functional mobility. HOME SUPPORT PRIOR TO ADMISSION: The patient lived with house mate but did not require assist.    Physical Therapy Goals  Initiated 3/18/2022    1. Patient will move from supine to sit and sit to supine  in bed with modified independence within 4 days. 2. Patient will perform sit to stand with modified independence within 4 days. 3. Patient will ambulate with modified independence for 120 feet with the least restrictive device within 4 days. 4. Patient will ascend/descend 3 stairs with 2 handrail(s) with modified independence within 4 days. 5. Patient will verbalize and demonstrate understanding of anterior total hip precautions per protocol within 4 days. 6. Patient will perform LARA home exercise program per protocol with independence within 4 days. Outcome: Progressing Towards Goal    PHYSICAL THERAPY TREATMENT  Patient: Socrates Kellogg (19 y.o. female)  Date: 3/20/2022  Diagnosis: Osteoarthritis of left hip [M16.12]  S/P total left hip arthroplasty [Z96.642] <principal problem not specified>  Procedure(s) (LRB):  LEFT TOTAL HIP ARTHROPLASTY ANTERIOR APPROACH (Left) 3 Days Post-Op  Precautions: WBAT,Fall  Chart, physical therapy assessment, plan of care and goals were reviewed. ASSESSMENT  Patient continues with skilled PT services and is progressing towards goals. Patient with good progress toward transfer, gait, and stair goals today. Patient remains requiring mod A for bed mobility which is her biggest limiting factor at this time. Patient now requiring CGA for transfers, CGA for ambulation with RW up to 85ft, and CGA for 4 stairs with use of L rail. Patient with BP WNL in sitting and standing.  Patient continues to report high levels of pain that is limiting her bed mobility. Patient instructed to be walking to and from bathroom with staff vs using BSC. Patient reports that she lives with a roommate who can provide some assistance with bed mobility however he works from [employer]. Patient will continue to benefit from acute PT while in hospital.     Current Level of Function Impacting Discharge (mobility/balance): mod A for bed mobility, unable to stand without support from RW    Other factors to consider for discharge: lives with a roommate who works during the day, will need to be independent during the day         PLAN :  Patient continues to benefit from skilled intervention to address the above impairments. Continue treatment per established plan of care. to address goals. Recommendation for discharge: (in order for the patient to meet his/her long term goals)  Physical therapy at least 2 days/week in the home vs SNF depending on progress with bed mobility/pain control    This discharge recommendation:  Has been made in collaboration with the attending provider and/or case management    IF patient discharges home will need the following DME: patient owns DME required for discharge       SUBJECTIVE:   Patient stated it's just the pain that is limiting me.     OBJECTIVE DATA SUMMARY:   Critical Behavior:  Neurologic State: Alert       Functional Mobility Training:  Bed Mobility:  Rolling: Contact guard assistance;Assist x1  Supine to Sit: Moderate assistance;Assist x1    Transfers:  Sit to Stand: Contact guard assistance;Assist x1  Stand to Sit: Contact guard assistance;Assist x1  Stand Pivot Transfers: Contact guard assistance;Assist x1          Balance:     Ambulation/Gait Training:  Distance (ft): 85 Feet (ft)  Assistive Device: Walker, rolling  Ambulation - Level of Assistance: Contact guard assistance;Assist x1        Gait Abnormalities: Antalgic; Step to gait        Base of Support: Widened;Shift to right  Stance: Left decreased;Right increased  Speed/Nora: Slow;Pace decreased (<100 feet/min)  Step Length: Left shortened;Right lengthened  Swing Pattern: Left asymmetrical       Stairs:  Number of Stairs Trained: 4  Stairs - Level of Assistance: Contact guard assistance;Assist X1   Rail Use: Left     Therapeutic Exercises:   SUPINE  EXERCISES   Sets   Reps   Active Active Assist   Passive Self ROM   Comments   Ankle Pumps   [x]                                        []                                        []                                        []                                           Quad Sets   []                                        []                                        []                                        []                                           Heel Slides   [x]                                        []                                        []                                        []                                           Hip Abduction   []                                        []                                        []                                        []                                           Glut Sets   []                                        []                                        []                                        []                                              []                                        []                                        []                                        []                                              []                                        []                                        []                                        []                                               Pain Ratin/10    Activity Tolerance:   Fair and requires rest breaks      After treatment patient left in no apparent distress:   Sitting in chair, Call bell within reach, and physician present    COMMUNICATION/COLLABORATION:   The patients plan of care was discussed with: Registered nurse and Physician. Louann Marie, PT   Time Calculation: 39 mins

## 2022-03-21 VITALS
DIASTOLIC BLOOD PRESSURE: 71 MMHG | SYSTOLIC BLOOD PRESSURE: 126 MMHG | BODY MASS INDEX: 39.99 KG/M2 | WEIGHT: 233 LBS | RESPIRATION RATE: 16 BRPM | TEMPERATURE: 98.1 F | OXYGEN SATURATION: 99 % | HEART RATE: 77 BPM

## 2022-03-21 PROCEDURE — 97535 SELF CARE MNGMENT TRAINING: CPT

## 2022-03-21 PROCEDURE — G0378 HOSPITAL OBSERVATION PER HR: HCPCS

## 2022-03-21 PROCEDURE — 74011250637 HC RX REV CODE- 250/637

## 2022-03-21 PROCEDURE — 97530 THERAPEUTIC ACTIVITIES: CPT

## 2022-03-21 PROCEDURE — 74011250637 HC RX REV CODE- 250/637: Performed by: ORTHOPAEDIC SURGERY

## 2022-03-21 PROCEDURE — 74011000250 HC RX REV CODE- 250: Performed by: ORTHOPAEDIC SURGERY

## 2022-03-21 PROCEDURE — 97116 GAIT TRAINING THERAPY: CPT

## 2022-03-21 RX ADMIN — HYDROCODONE BITARTRATE AND ACETAMINOPHEN 1 TABLET: 10; 325 TABLET ORAL at 17:25

## 2022-03-21 RX ADMIN — ASPIRIN 81 MG: 81 TABLET, COATED ORAL at 08:39

## 2022-03-21 RX ADMIN — SODIUM CHLORIDE, PRESERVATIVE FREE 10 ML: 5 INJECTION INTRAVENOUS at 14:00

## 2022-03-21 RX ADMIN — HYDROCODONE BITARTRATE AND ACETAMINOPHEN 1 TABLET: 10; 325 TABLET ORAL at 10:44

## 2022-03-21 RX ADMIN — Medication 1 CAPSULE: at 08:39

## 2022-03-21 RX ADMIN — SODIUM CHLORIDE, PRESERVATIVE FREE 10 ML: 5 INJECTION INTRAVENOUS at 06:24

## 2022-03-21 RX ADMIN — POLYETHYLENE GLYCOL 3350 17 G: 17 POWDER, FOR SOLUTION ORAL at 08:39

## 2022-03-21 RX ADMIN — FAMOTIDINE 20 MG: 20 TABLET ORAL at 08:39

## 2022-03-21 RX ADMIN — ASPIRIN 81 MG: 81 TABLET, COATED ORAL at 17:25

## 2022-03-21 RX ADMIN — CELECOXIB 200 MG: 200 CAPSULE ORAL at 08:39

## 2022-03-21 RX ADMIN — METOPROLOL TARTRATE 12.5 MG: 25 TABLET, FILM COATED ORAL at 17:25

## 2022-03-21 RX ADMIN — CELECOXIB 200 MG: 200 CAPSULE ORAL at 17:25

## 2022-03-21 RX ADMIN — SENNOSIDES AND DOCUSATE SODIUM 1 TABLET: 50; 8.6 TABLET ORAL at 08:39

## 2022-03-21 RX ADMIN — HYDROCODONE BITARTRATE AND ACETAMINOPHEN 1 TABLET: 10; 325 TABLET ORAL at 06:24

## 2022-03-21 RX ADMIN — SENNOSIDES AND DOCUSATE SODIUM 1 TABLET: 50; 8.6 TABLET ORAL at 17:25

## 2022-03-21 RX ADMIN — FAMOTIDINE 20 MG: 20 TABLET ORAL at 17:25

## 2022-03-21 NOTE — PROGRESS NOTES
Problem: Mobility Impaired (Adult and Pediatric)  Goal: *Acute Goals and Plan of Care (Insert Text)  Description: FUNCTIONAL STATUS PRIOR TO ADMISSION: Patient was modified independent using a single point cane for functional mobility. HOME SUPPORT PRIOR TO ADMISSION: The patient lived with house mate but did not require assist.    Physical Therapy Goals  Initiated 3/18/2022    1. Patient will move from supine to sit and sit to supine  in bed with modified independence within 4 days. 2. Patient will perform sit to stand with modified independence within 4 days. 3. Patient will ambulate with modified independence for 120 feet with the least restrictive device within 4 days. 4. Patient will ascend/descend 3 stairs with 2 handrail(s) with modified independence within 4 days. 5. Patient will verbalize and demonstrate understanding of anterior total hip precautions per protocol within 4 days. 6. Patient will perform LARA home exercise program per protocol with independence within 4 days. 3/21/2022 1547 by Lexi Holland, PT  Outcome: Progressing Towards Goal  3/21/2022 0929 by Lexi Holland, PT  Outcome: Progressing Towards Goal    PHYSICAL THERAPY TREATMENT  Patient: Juan Gunn (75 y.o. female)  Date: 3/21/2022  Diagnosis: Osteoarthritis of left hip [M16.12]  S/P total left hip arthroplasty [Z96.642] <principal problem not specified>  Procedure(s) (LRB):  LEFT TOTAL HIP ARTHROPLASTY ANTERIOR APPROACH (Left) 4 Days Post-Op  Precautions: WBAT,Fall  Chart, physical therapy assessment, plan of care and goals were reviewed. ASSESSMENT  Patient continues with skilled PT services and is progressing towards goals. Patient with great progress toward gait goals in PM session. Patient transfers with mod I, ambulates with supervision with RW up to 110ft. Cues for step through gait pattern and increased WB through LLE. Patient demonstrates increased BUE offloading through RW during all gait.  Patient reports no increased pain with mobility today. Patient reports her roommate will be coming to pick her up this PM and will be discharging home. Per CM, home with HH. Patient improved bed mobility with OT earlier today. Cleared by PT for discharge when ready. Current Level of Function Impacting Discharge (mobility/balance): use of RW for mobility, static balance good    Other factors to consider for discharge: has roommate who is able to help when not at work (works 7-4pm)         PLAN :  Patient continues to benefit from skilled intervention to address the above impairments. Continue treatment per established plan of care. to address goals. Recommendation for discharge: (in order for the patient to meet his/her long term goals)  Physical therapy at least 2 days/week in the home AND ensure assist and/or supervision for safety with all mobility    This discharge recommendation:  Has been made in collaboration with the attending provider and/or case management    IF patient discharges home will need the following DME: patient owns DME required for discharge       SUBJECTIVE:   Patient stated I feel more confident now.     OBJECTIVE DATA SUMMARY:   Critical Behavior:  Neurologic State: Alert  Orientation Level: Oriented X4  Cognition: Follows commands  Safety/Judgement: Awareness of environment  Functional Mobility Training:  Bed Mobility:  Rolling: Modified independent  Supine to Sit: Stand-by assistance; Additional time  Sit to Supine: Contact guard assistance; Additional time  Scooting: Minimum assistance;Assist x1;Additional time        Transfers:  Sit to Stand: Modified independent  Stand to Sit: Modified independent  Stand Pivot Transfers: Modified independent          Balance:  Sitting: Intact; Without support  Standing: Intact  Standing - Static: Good  Standing - Dynamic : Occasional  Ambulation/Gait Training:  Distance (ft): 110 Feet (ft)  Assistive Device: Walker, rolling;Gait belt  Ambulation - Level of Assistance: Supervision        Gait Abnormalities: Antalgic; Step to gait     Left Side Weight Bearing: As tolerated  Base of Support: Widened;Shift to right  Stance: Left decreased;Right increased  Speed/Nora: Slow;Pace decreased (<100 feet/min)  Step Length: Left lengthened;Right shortened  Swing Pattern: Left asymmetrical       Pain Rating:  Controlled with medication     Activity Tolerance:   Good      After treatment patient left in no apparent distress:   Sitting in chair and Call bell within reach    COMMUNICATION/COLLABORATION:   The patients plan of care was discussed with: Registered nurse and Case management.      Nomi Fuller, PT   Time Calculation: 25 mins

## 2022-03-21 NOTE — PROGRESS NOTES
Problem: Self Care Deficits Care Plan (Adult)  Goal: *Acute Goals and Plan of Care (Insert Text)  Description: FUNCTIONAL STATUS PRIOR TO ADMISSION: Patient was modified independent using a SPC for functional mobility. HOME SUPPORT: The patient lived with roommate but did not require assist.    Occupational Therapy Goals  Initiated 3/18/2022  1. Patient will perform lower body ADLs with AE supervision/set-up within 4 day(s). 2.  Patient will perform upper body ADLs standing 5 mins without fatigue or LOB with supervision/set-up within 4 day(s). 3.  Patient will perform toilet transfer with supervision/set-up within 4 day(s). 4.  Patient will perform all aspects of toileting with supervision/set-up within 4 day(s). 5.  Patient will participate in upper extremity therapeutic exercise/activities with supervision/set-up for 10 minutes within 4 day(s). 6.  Patient will utilize energy conservation techniques during functional activities without cues within 4 day(s). Outcome: Progressing Towards Goal     OCCUPATIONAL THERAPY TREATMENT  Patient: John Jerome (89 y.o. female)  Date: 3/21/2022  Diagnosis: Osteoarthritis of left hip [M16.12]  S/P total left hip arthroplasty [Z96.642] <principal problem not specified>  Procedure(s) (LRB):  LEFT TOTAL HIP ARTHROPLASTY ANTERIOR APPROACH (Left) 4 Days Post-Op  Precautions: WBAT,Fall  Chart, occupational therapy assessment, plan of care, and goals were reviewed. ASSESSMENT  Patient continues with skilled OT services and is progressing towards goals. Pt noted with progressive toileting completing at RW with SBA, with pt also progressing with bed mobility, with SBA to complete supine to EOB, as well as, CGA for EOB to incline. Pt required extended training and additional time to complete bed mobility, with education provided regarding use of bed rails and leg .   Pt continues to benefit from skilled OT to address functional deficits during acute hospitalization, with reporting therapist believing pt would benefit from [facility] and ADL/IADL Supervision upon discharge. Current Level of Function Impacting Discharge (ADLs): up to Min A for ADL completion    Other factors to consider for discharge: none         PLAN :  Patient continues to benefit from skilled intervention to address the above impairments. Continue treatment per established plan of care to address goals. Recommend with staff: OOB meals, Active ADL engagement    Recommend next OT session: POC progression    Recommendation for discharge: (in order for the patient to meet his/her long term goals)  Occupational therapy at least 2 days/week in the home AND ensure assist and/or supervision for safety with ADL/IADLs    This discharge recommendation:  Has been made in collaboration with the attending provider and/or case management    IF patient discharges home will need the following DME: walker: rolling       SUBJECTIVE:   Patient stated Shani Boggs so much for your help.     OBJECTIVE DATA SUMMARY:   Cognitive/Behavioral Status:  Neurologic State: Alert  Orientation Level: Oriented X4  Cognition: Follows commands  Perception: Appears intact  Perseveration: No perseveration noted  Safety/Judgement: Awareness of environment    Functional Mobility and Transfers for ADLs:  Bed Mobility:  Rolling: Modified independent  Supine to Sit: Stand-by assistance; Additional time  Sit to Supine: Contact guard assistance; Additional time    Transfers:  Sit to Stand: Modified independent  Functional Transfers  Bathroom Mobility: Stand-by assistance  Toilet Transfer : Stand-by assistance    Balance:  Sitting: Intact  Standing: Intact; Without support  Standing - Static: Fair  Standing - Dynamic : Occasional    ADL Intervention:  Toileting  Toileting Assistance: Stand-by assistance  Bladder Hygiene: Modified independent  Bowel Hygiene: Modified indpendent  Clothing Management: Stand-by assistance  Adaptive Equipment: Walker    Cognitive Retraining  Safety/Judgement: Awareness of environment    Pain:  Pt with c/o L hip pain, did not quantify; RN aware and following    Activity Tolerance:   Good, Fair, and requires rest breaks    After treatment patient left in no apparent distress:   Supine in bed, Call bell within reach, and Side rails x 3    COMMUNICATION/COLLABORATION:   The patients plan of care was discussed with: Physical therapist, Registered nurse, and Case management.      Alicia Allen OT  Time Calculation: 39 mins

## 2022-03-21 NOTE — PROGRESS NOTES
Progress Note    Status Post: Left total hip arthroplasty by Direct Anterior approach  Date of Surgery: 3/17/22  Surgeon: Dr. Radha Simms    Subjective:     No acute events over night. Brandon Soto states that she is doing well. The hydrocodone has been more effective in pain control. She is progressing more with therapy. She wanted to stay 1 more night for working with PT which she has done well with. Denies CP, SOB, nausea/vomitting, parasthesias. Objective:     Visit Vitals  /67 (BP 1 Location: Left upper arm, BP Patient Position: Standing)   Pulse 80   Temp 97.4 °F (36.3 °C)   Resp 16   Wt 105.7 kg (233 lb)   SpO2 98%   BMI 39.99 kg/m²       Patient Vitals for the past 24 hrs:   Temp Pulse Resp BP SpO2   03/21/22 0849 -- -- -- 117/67 --   03/21/22 0843 97.4 °F (36.3 °C) 80 16 111/60 98 %   03/21/22 0618 -- 60 -- (!) 113/54 --   03/21/22 0305 98 °F (36.7 °C) 79 16 (!) 110/57 98 %   03/20/22 2129 97.4 °F (36.3 °C) 85 17 (!) 117/56 96 %   03/20/22 1441 97.8 °F (36.6 °C) 73 16 (!) 121/52 100 %        Physical Exam:  Gen: NAD, AAOx3  Resp: No acute respiratory distress  MSK:  LLE  Dressing is clean, dry, and intact   Motor intact distally  Sensation is intact to light touch distally   Moderate swelling about the hip as expected  Mild ecchymosis around the hip    Gait  Base of Support: Widened,Shift to right  Speed/Nora: Slow,Pace decreased (<100 feet/min),Shuffled  Step Length: Left lengthened,Right shortened  Swing Pattern: Left asymmetrical  Stance: Left decreased,Right increased  Gait Abnormalities: Antalgic,Step to gait  Ambulation - Level of Assistance: Supervision  Distance (ft): 75 Feet (ft)  Assistive Device: Walker, rolling  Rail Use: Left   Stairs - Level of Assistance: Contact guard assistance,Assist X1  Number of Stairs Trained: 4       No intake or output data in the 24 hours ending 03/21/22 1238    LABS :  No results found for this or any previous visit (from the past 24 hour(s)). Assessment:   Monica Zapien is a 73y.o. year-old female with Left hip osteoarthritis status post total hip replacement by Direct Anterior Approach POD #4    Plan:   -The patient required an overnight stay for pain control and to improve mobility with Physical Therapy.  -Weighbearing: WBAT LLE, device as needed  -Hip precautions: Anterior  -DVT prophylaxis: SCDs, frequent ambulation, ASA 81mg BID  -Antibiotics: completed  -Pain control: Continue as needed pain management, ice to operative area. I adjusted the pain medication, hydrocodone 10 mg has control her pain better. A prescription was sent for discharge. I contacted the pharmacy to clarify the multiple oxycodone and hydrocodone prescriptions and it is straightened out and should be available to her  -PT/OT for mobilization and gait training.   -Incentive Spirometry. She did not have one at the bedside so I brought 1 2 very large to use it. I advised she continue to use it on discharge, especially with the amount of time she has spent laying in bed postoperative  -Dispo: Discharge planning to Home today  Follow up with me in office in 10-14 days        Nanette Wheeler MD    03/21/22  12:38 PM        Nanette Wheeler M.D.   [address] [facility] Office  Cell: [phone]  Office: [phone]  Medical Staff: Junior Tone MA

## 2022-03-21 NOTE — PROGRESS NOTES
Problem: Mobility Impaired (Adult and Pediatric)  Goal: *Acute Goals and Plan of Care (Insert Text)  Description: FUNCTIONAL STATUS PRIOR TO ADMISSION: Patient was modified independent using a single point cane for functional mobility. HOME SUPPORT PRIOR TO ADMISSION: The patient lived with house mate but did not require assist.    Physical Therapy Goals  Initiated 3/18/2022    1. Patient will move from supine to sit and sit to supine  in bed with modified independence within 4 days. 2. Patient will perform sit to stand with modified independence within 4 days. 3. Patient will ambulate with modified independence for 120 feet with the least restrictive device within 4 days. 4. Patient will ascend/descend 3 stairs with 2 handrail(s) with modified independence within 4 days. 5. Patient will verbalize and demonstrate understanding of anterior total hip precautions per protocol within 4 days. 6. Patient will perform LARA home exercise program per protocol with independence within 4 days. Outcome: Progressing Towards Goal   PHYSICAL THERAPY TREATMENT  Patient: Georgie Valdez (74 y.o. female)  Date: 3/21/2022  Diagnosis: Osteoarthritis of left hip [M16.12]  S/P total left hip arthroplasty [Z96.642] <principal problem not specified>  Procedure(s) (LRB):  LEFT TOTAL HIP ARTHROPLASTY ANTERIOR APPROACH (Left) 4 Days Post-Op  Precautions: WBAT,Fall, anterior hip precautions  Chart, physical therapy assessment, plan of care and goals were reviewed. ASSESSMENT  Patient continues with skilled PT services and is progressing towards goals. Patient with good progress toward goals today. Patient demonstrates improved supine to sit and sit to supine now requiring Min A x 1 vs. mod A x 1 during yesterday's sessions.  Patient was provided with extensive education regarding use of gait belt as leg lift and other recommendations to be more independent getting in/out of bed however patient reports she does not feel she can do that with history of L RTC injury. PT attempted to try on multiple attempts from each side of bed. Eventually patient discontinued use of gait belt and asked for assistance. Min A x 1 required. Transfers were mod I today, demonstrating good safety awareness. Patient continues to require S for gait training with cues for improved gait pattern. Patient demonstrates step to gait pattern with antalgic pattern noted. Patient ambulated up to 75ft with RW and S today. Patient reports 5/10 pain complaints during treatment. Patient will require assistance from roommate/friend in order to safely discharge home. Patient reports he will be able to provide this before and after his working hours. Patient will continue to benefit from acute care PT while in hospital.     Current Level of Function Impacting Discharge (mobility/balance): continues to require min A for supine to sit, sit to supine at this time    Other factors to consider for discharge: lives with roommate who works 7-4pm each day per patient         PLAN :  Patient continues to benefit from skilled intervention to address the above impairments. Continue treatment per established plan of care. to address goals. Recommendation for discharge: (in order for the patient to meet his/her long term goals)  Physical therapy at least 2 days/week in the home AND ensure assist and/or supervision for safety with stairs to enter home, min A for supine to sit and sit to supine    This discharge recommendation:  Has been made in collaboration with the attending provider and/or case management    IF patient discharges home will need the following DME: patient owns DME required for discharge       SUBJECTIVE:   Patient stated I feel like I am doing a little better today.     OBJECTIVE DATA SUMMARY:   Critical Behavior:  Neurologic State: Alert           Functional Mobility Training:  Bed Mobility:  Rolling: Modified independent  Supine to Sit: Minimum assistance;Assist x1;Additional time  Sit to Supine: Minimum assistance;Assist x1;Additional time  Scooting: Minimum assistance;Assist x1;Additional time        Transfers:  Sit to Stand: Modified independent  Stand to Sit: Modified independent  Stand Pivot Transfers: Modified independent          Balance:  Standing: Intact; Without support  Standing - Static: Fair  Standing - Dynamic : Occasional  Ambulation/Gait Training:  Distance (ft): 75 Feet (ft)  Assistive Device: Walker, rolling  Ambulation - Level of Assistance: Supervision        Gait Abnormalities: Antalgic; Step to gait     Left Side Weight Bearing: As tolerated  Base of Support: Widened;Shift to right  Stance: Left decreased;Right increased  Speed/Nora: Slow;Pace decreased (<100 feet/min); Shuffled  Step Length: Left lengthened;Right shortened  Swing Pattern: Left asymmetrical         Therapeutic Exercises:   SUPINE  EXERCISES   Sets   Reps   Active Active Assist   Passive Self ROM   Comments   Ankle Pumps   [x]                                        []                                        []                                        []                                           Quad Sets   []                                        []                                        []                                        []                                           Heel Slides   [x]                                        []                                        []                                        []                                           Hip Abduction   []                                        []                                        []                                        []                                           Glut Sets   []                                        []                                        []                                        []                                              []                                        [] []                                        []                                              []                                        []                                        []                                        []                                               Pain Ratin/10    Activity Tolerance:   Fair and requires rest breaks      After treatment patient left in no apparent distress:   Sitting in chair, Call bell within reach, and waiting for breakfast    COMMUNICATION/COLLABORATION:   The patients plan of care was discussed with: Occupational therapist and Registered nurse.      Ronn Muniz, PT   Time Calculation: 43 mins

## 2022-03-21 NOTE — DISCHARGE SUMMARY
Ortho Discharge Summary    Patient ID:  Candido Marlow  [id number]  female  73 y.o.  1955    Admit date: 3/17/2022    Discharge date: 3/21/2022    Admitting Physician: Shad Sandhu MD     Consulting Physician(s):   Treatment Team: Attending Provider: Analy Candelario MD; Utilization Review: Real Rod RN; Care Manager: Duc Goel RN; Care Manager: Ross Hancock; Staff Nurse: Ria Franco RN    Date of Surgery:   3/17/2022     Preoperative Diagnosis:  LEFT HIP OSTEOARTHRITIS    Postoperative Diagnosis:   LEFT HIP OSTEOARTHRITIS    Procedure(s):   LEFT TOTAL HIP ARTHROPLASTY ANTERIOR APPROACH     Anesthesia Type:   General     Surgeon: Analy Candelario MD                            HPI:  Pt is a 73 y.o. female who has a history of LEFT HIP OSTEOARTHRITIS  with pain and limitations of activities of daily living who presents at this time for a left LEFT TOTAL HIP ARTHROPLASTY ANTERIOR APPROACH following the failure of conservative management. PMH:   Past Medical History:   Diagnosis Date    Anemia     HISTORY OF ANEMIA    Arthritis     BACK & HIPS    Cancer ([facility] [address] 2021    RIGHT UPPER CHEST    Heart attack ([facility] [address] 2016    Hypertension     Prediabetes     Rotator cuff injury     LEFT SIDE    Stroke ([facility] [address] 01/04/2006       Body mass index is 39.99 kg/m². : A BMI > 30 is classified as obesity and > 40 is classified as morbid obesity. Medications upon admission :   Prior to Admission Medications   Prescriptions Last Dose Informant Patient Reported? Taking? L. rhamnosus GG/inulin ([address] [place] [address]) 3/16/2022 at 1600  Yes Yes   Sig: Take 1 Tablet by mouth nightly. MELATONIN PO 3/15/2022  Yes No   Sig: Take 1 Tablet by mouth nightly. acetaminophen/diphenhydramine (TYLENOL PM PO) 3/16/2022 at 1600  Yes Yes   Sig: Take 2 Capsules by mouth nightly. amoxicillin (AMOXIL) 875 mg tablet 3/10/2022 at Unknown time  Yes Yes   Sig: Take 875 mg by mouth two (2) times a day. aspirin delayed-release 81 mg tablet 3/15/2022  Yes Yes   Sig: Take 162 mg by mouth nightly. atorvastatin (LIPITOR) 80 mg tablet 3/16/2022 at 1600  Yes Yes   Sig: Take 80 mg by mouth nightly. cholecalciferol, vitamin D3, (VITAMIN D3 PO) 3/15/2022  Yes No   Sig: Take 1 Capsule by mouth two (2) times a day. 250 MCG   lisinopriL (PRINIVIL, ZESTRIL) 10 mg tablet 3/16/2022 at 1600  Yes Yes   Sig: Take 10 mg by mouth every morning. metoprolol tartrate (LOPRESSOR) 25 mg tablet 3/16/2022 at 1600  Yes Yes   Sig: Take 12.5 mg by mouth two (2) times a day. Facility-Administered Medications: None        Allergies:  No Known Allergies     Hospital Course: The patient underwent surgery. Complications:  None; patient tolerated the procedure well. Was taken to the PACU in stable condition and then transferred to the ortho floor. Patient was found to have expected post operative anemia with softer blood pressures that responded to IV fluids. Perioperative Antibiotics:  Ancef     Postoperative Pain Management:  Celebrex, Norco    DVT Prophylaxis: Aspirin 81 mg PO BID    Postoperative transfusions:    Number of units banked PRBCs =   none     Post Op complications: none    Hemoglobin at discharge:    Lab Results   Component Value Date/Time    HGB 9.1 (L) 03/19/2022 03:08 AM       Aquacel dressing remained in place- clean, dry and intact. No significant erythema or swelling. Wound appears to be healing without any evidence of infection. Neurovascular exam found to be within normal limits. Physical Therapy started following surgery and participated in bed mobility, transfers and ambulation. Initially patient's mobility slow due to persistent pain. Required additional therapy sessions while in hospital but ultimately cleared for discharge on POD 4.           Gait:  Gait  Base of Support: Widened,Shift to right  Speed/Nora: Slow,Pace decreased (<100 feet/min),Shuffled  Step Length: Left lengthened,Right shortened  Swing Pattern: Left asymmetrical  Stance: Left decreased,Right increased  Gait Abnormalities: Antalgic,Step to gait  Ambulation - Level of Assistance: Supervision  Distance (ft): 75 Feet (ft)  Assistive Device: Walker, rolling  Rail Use: Left   Stairs - Level of Assistance: Contact guard assistance,Assist X1  Number of Stairs Trained: 4                   Discharged to: Home with  PT.      Condition on Discharge:   good    Discharge instructions:  - Anticoagulate with Aspirin 81 mg PO BID  - Take pain medications as prescribed  - Resume pre hospital diet      - Discharge activity: activity as tolerated  - Ambulate with assistive device as needed. - Weight bearing status as tolerated  - Wound Care Keep wound clean and dry. See discharge instruction sheet. -DISCHARGE MEDICATION LIST     Current Discharge Medication List      START taking these medications    Details   HYDROcodone-acetaminophen (NORCO)  mg tablet Take 1 Tablet by mouth every four (4) hours as needed for Pain for up to 7 days. Max Daily Amount: 6 Tablets. Qty: 30 Tablet, Refills: 0  Start date: 3/20/2022, End date: 3/27/2022    Associated Diagnoses: S/P total left hip arthroplasty; Primary osteoarthritis of left hip      celecoxib (CELEBREX) 200 mg capsule Take 1 Capsule by mouth two (2) times a day for 90 days. Qty: 60 Capsule, Refills: 2  Start date: 3/18/2022, End date: 6/16/2022      famotidine (PEPCID) 20 mg tablet Take 1 Tablet by mouth two (2) times a day for 30 days. Qty: 60 Tablet, Refills: 0  Start date: 3/18/2022, End date: 4/17/2022      methocarbamoL (ROBAXIN) 500 mg tablet Take 1 Tablet by mouth four (4) times daily as needed for Muscle Spasm(s) or Pain. Qty: 30 Tablet, Refills: 0  Start date: 3/18/2022      polyethylene glycol (MIRALAX) 17 gram packet Take 1 Packet by mouth daily for 14 days.   Qty: 14 Packet, Refills: 0  Start date: 3/19/2022, End date: 4/2/2022      senna-docusate (PERICOLACE) 8.6-50 mg per tablet Take 1 Tablet by mouth two (2) times a day for 14 days. Qty: 28 Tablet, Refills: 0  Start date: 3/18/2022, End date: 4/1/2022         CONTINUE these medications which have CHANGED    Details   aspirin delayed-release 81 mg tablet Take 1 Tablet by mouth two (2) times a day for 30 days. Qty: 60 Tablet, Refills: 0  Start date: 3/18/2022, End date: 4/17/2022         CONTINUE these medications which have NOT CHANGED    Details   atorvastatin (LIPITOR) 80 mg tablet Take 80 mg by mouth nightly. amoxicillin (AMOXIL) 875 mg tablet Take 875 mg by mouth two (2) times a day. metoprolol tartrate (LOPRESSOR) 25 mg tablet Take 12.5 mg by mouth two (2) times a day. acetaminophen/diphenhydramine (TYLENOL PM PO) Take 2 Capsules by mouth nightly. lisinopriL (PRINIVIL, ZESTRIL) 10 mg tablet Take 10 mg by mouth every morning. L. rhamnosus GG/inulin (C[employer]URE[employer] PO) Take 1 Tablet by mouth nightly. cholecalciferol, vitamin D3, (VITAMIN D3 PO) Take 1 Capsule by mouth two (2) times a day. 250 MCG      MELATONIN PO Take 1 Tablet by mouth nightly.           per medical continuation form      -Follow up in office in 2 weeks      Signed:  Justine Chen NP  Orthopaedic Nurse Practitioner    3/21/2022  12:24 PM

## 2022-03-24 PROBLEM — Z96.642 S/P TOTAL LEFT HIP ARTHROPLASTY: Status: ACTIVE | Noted: 2022-03-17

## 2022-03-24 PROBLEM — M16.12 OSTEOARTHRITIS OF LEFT HIP: Status: ACTIVE | Noted: 2022-03-17

## 2023-05-18 RX ORDER — AMOXICILLIN 875 MG/1
875 TABLET, COATED ORAL 2 TIMES DAILY
COMMUNITY

## 2023-05-18 RX ORDER — METHOCARBAMOL 500 MG/1
500 TABLET, FILM COATED ORAL 4 TIMES DAILY PRN
COMMUNITY
Start: 2022-03-18

## 2023-05-18 RX ORDER — LISINOPRIL 10 MG/1
10 TABLET ORAL
COMMUNITY

## 2023-05-18 RX ORDER — ATORVASTATIN CALCIUM 80 MG/1
80 TABLET, FILM COATED ORAL NIGHTLY
COMMUNITY

## (undated) DEVICE — PADDING CAST SPEC 6INX4YD COT --

## (undated) DEVICE — GLOVE ORTHO 7 1/2   MSG9475

## (undated) DEVICE — COVER,MAYO STAND,STERILE: Brand: MEDLINE

## (undated) DEVICE — SUTURE VCRL 0 L27IN ABSRB VLT CT L40MM 1/2 CIR TAPERPOINT J352H

## (undated) DEVICE — SUTURE VCRL SZ 2-0 L36IN ABSRB UD L40MM CT 1/2 CIR J957H

## (undated) DEVICE — C-ARM: Brand: UNBRANDED

## (undated) DEVICE — SYR LR LCK 1ML GRAD NSAF 30ML --

## (undated) DEVICE — BLADE ELECTRODE: Brand: EDGE

## (undated) DEVICE — SOLUTION IRRIG 3000ML 0.9% SOD CHL USP UROMATIC PLAS CONT

## (undated) DEVICE — TOTAL JOINT - SMH: Brand: MEDLINE INDUSTRIES, INC.

## (undated) DEVICE — 2108 SERIES SAGITTAL BLADE (18.6 X 0.8 X 73.8MM)

## (undated) DEVICE — SUTURE VCRL SZ 2 L54IN ABSRB UD L65MM TP-1 1/2 CIR J880T

## (undated) DEVICE — DERMABOND SKIN ADH 0.7ML -- DERMABOND ADVANCED 12/BX

## (undated) DEVICE — PREP SKN CHLRAPRP APL 26ML STR --

## (undated) DEVICE — SOLUTION IRRIG 1000ML STRL H2O USP PLAS POUR BTL

## (undated) DEVICE — SUTURE MCRYL SZ 3-0 L27IN ABSRB UD L19MM PS-2 3/8 CIR PRIM Y427H

## (undated) DEVICE — SCRUB DRY SURG EZ SCRUB BRUSH PREOPERATIVE GRN

## (undated) DEVICE — YANKAUER,FLEXIBLE HANDLE,REGLR CAPACITY: Brand: MEDLINE INDUSTRIES, INC.

## (undated) DEVICE — GLOVE SURG SZ 8 L12IN FNGR THK79MIL GRN LTX FREE

## (undated) DEVICE — 6619 2 PTNT ISO SYS INCISE AREA&LT;(&GT;&&LT;)&GT;P: Brand: STERI-DRAPE™ IOBAN™ 2

## (undated) DEVICE — SUTURE STRATAFIX SYMMETRIC PDS + SZ 1 L18IN ABSRB VLT L48MM SXPP1A400

## (undated) DEVICE — DRESSING HYDROCOLLOID BORDER 35X10 IN ALUM PRIMASEAL

## (undated) DEVICE — 4-PORT MANIFOLD: Brand: NEPTUNE 2

## (undated) DEVICE — NEEDLE SPNL 22GA L3.5IN BLK HUB S STL REG WALL FIT STYL W/

## (undated) DEVICE — TOTAL TRAY, 16FR 10ML SIL FOLEY, URN: Brand: MEDLINE

## (undated) DEVICE — GARMENT,MEDLINE,DVT,INT,CALF,MED, GEN2: Brand: MEDLINE